# Patient Record
Sex: FEMALE | Race: WHITE | Employment: FULL TIME | URBAN - METROPOLITAN AREA
[De-identification: names, ages, dates, MRNs, and addresses within clinical notes are randomized per-mention and may not be internally consistent; named-entity substitution may affect disease eponyms.]

---

## 2017-12-14 PROBLEM — E66.01 OBESITY, MORBID (HCC): Status: ACTIVE | Noted: 2017-12-14

## 2019-09-25 ENCOUNTER — HOSPITAL ENCOUNTER (OUTPATIENT)
Dept: MAMMOGRAPHY | Age: 44
Discharge: HOME | End: 2019-09-25
Attending: OBSTETRICS & GYNECOLOGY

## 2019-09-25 DIAGNOSIS — Z12.39 ENCOUNTER FOR SCREENING FOR MALIGNANT NEOPLASM OF BREAST: ICD-10-CM

## 2021-04-26 ENCOUNTER — HOSPITAL ENCOUNTER (OUTPATIENT)
Dept: SLEEP MEDICINE | Age: 46
Discharge: HOME | End: 2021-04-26
Payer: OTHER GOVERNMENT

## 2021-04-26 PROCEDURE — 95810 POLYSOM 6/> YRS 4/> PARAM: CPT

## 2021-05-05 PROBLEM — G47.33 OSA (OBSTRUCTIVE SLEEP APNEA): Status: ACTIVE | Noted: 2021-05-05

## 2021-05-05 PROBLEM — R40.0 DAYTIME SLEEPINESS: Status: ACTIVE | Noted: 2021-05-05

## 2021-09-16 ENCOUNTER — APPOINTMENT (RX ONLY)
Dept: URBAN - METROPOLITAN AREA CLINIC 349 | Facility: CLINIC | Age: 46
Setting detail: DERMATOLOGY
End: 2021-09-16

## 2021-09-16 DIAGNOSIS — Z71.89 OTHER SPECIFIED COUNSELING: ICD-10-CM

## 2021-09-16 DIAGNOSIS — D22 MELANOCYTIC NEVI: ICD-10-CM

## 2021-09-16 DIAGNOSIS — L57.8 OTHER SKIN CHANGES DUE TO CHRONIC EXPOSURE TO NONIONIZING RADIATION: ICD-10-CM

## 2021-09-16 DIAGNOSIS — D18.0 HEMANGIOMA: ICD-10-CM

## 2021-09-16 PROBLEM — D48.5 NEOPLASM OF UNCERTAIN BEHAVIOR OF SKIN: Status: ACTIVE | Noted: 2021-09-16

## 2021-09-16 PROBLEM — D18.01 HEMANGIOMA OF SKIN AND SUBCUTANEOUS TISSUE: Status: ACTIVE | Noted: 2021-09-16

## 2021-09-16 PROBLEM — D22.5 MELANOCYTIC NEVI OF TRUNK: Status: ACTIVE | Noted: 2021-09-16

## 2021-09-16 PROCEDURE — ? EDUCATIONAL RESOURCES PROVIDED

## 2021-09-16 PROCEDURE — ? SUNSCREEN RECOMMENDATIONS

## 2021-09-16 PROCEDURE — ? COUNSELING

## 2021-09-16 PROCEDURE — ? OBSERVATION

## 2021-09-16 PROCEDURE — ? OTHER

## 2021-09-16 PROCEDURE — 88305 TISSUE EXAM BY PATHOLOGIST: CPT

## 2021-09-16 PROCEDURE — 99204 OFFICE O/P NEW MOD 45 MIN: CPT | Mod: 25

## 2021-09-16 PROCEDURE — ? BODY PHOTOGRAPHY

## 2021-09-16 PROCEDURE — ? REFERRAL CORRESPONDENCE

## 2021-09-16 PROCEDURE — ? SHAVE REMOVAL

## 2021-09-16 PROCEDURE — ? PATHOLOGY BILLING

## 2021-09-16 PROCEDURE — 11311 SHAVE SKIN LESION 0.6-1.0 CM: CPT

## 2021-09-16 ASSESSMENT — LOCATION ZONE DERM
LOCATION ZONE: TRUNK
LOCATION ZONE: FACE
LOCATION ZONE: FACE
LOCATION ZONE: ARM

## 2021-09-16 ASSESSMENT — LOCATION SIMPLE DESCRIPTION DERM
LOCATION SIMPLE: UPPER BACK
LOCATION SIMPLE: LEFT FOREHEAD
LOCATION SIMPLE: LEFT SHOULDER
LOCATION SIMPLE: LEFT UPPER BACK
LOCATION SIMPLE: LEFT FOREARM
LOCATION SIMPLE: LEFT FOREHEAD

## 2021-09-16 ASSESSMENT — LOCATION DETAILED DESCRIPTION DERM
LOCATION DETAILED: LEFT LATERAL FOREHEAD
LOCATION DETAILED: LEFT MEDIAL UPPER BACK
LOCATION DETAILED: LEFT DISTAL DORSAL FOREARM
LOCATION DETAILED: LEFT LATERAL FOREHEAD
LOCATION DETAILED: LEFT POSTERIOR SHOULDER
LOCATION DETAILED: INFERIOR THORACIC SPINE
LOCATION DETAILED: LEFT LATERAL SHOULDER
LOCATION DETAILED: LEFT INFERIOR MEDIAL UPPER BACK

## 2021-09-16 NOTE — PROCEDURE: BODY PHOTOGRAPHY
Whole Body Statement: The whole body was photographed today.
Was The Entire Body Photographed (Cannot Bill Unless Entire Body Photographed)?: No
Detail Level: Generalized
Reason For Photography: The patient is obtaining body photography to observe existing suspicious moles and or monitor for the appearance of any new lesions.
Consent: Written consent obtained, risks reviewed for whole body photography. Patient understands that photograph costs may not be covered by insurance, and patient is ultimately responsible for payment.
Number Of Photographs (Optional- Will Not Render If 0): 13

## 2021-09-16 NOTE — PROCEDURE: OTHER
Render Risk Assessment In Note?: yes
Other (Free Text): Discussed risk of bleeding, scarring, and infection.
Patient Management Risk Assessment: Moderate
Note Text (......Xxx Chief Complaint.): This diagnosis correlates with the
Detail Level: Detailed

## 2022-02-06 ENCOUNTER — NURSE TRIAGE (OUTPATIENT)
Dept: OTHER | Facility: CLINIC | Age: 47
End: 2022-02-06

## 2022-02-07 NOTE — TELEPHONE ENCOUNTER
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 \"     Current Symptoms: none at this time    Pain Severity: n/a; n/a; n/a    Temperature: patient denies n/a    What has been tried: nothing    LMP: 1/30/2022 Pregnant: No    Recommended disposition: See PCP within 3 days. Care advice provided, patient verbalizes understanding; denies any other questions or concerns; instructed to call back for any new or worsening symptoms. Will follow up with [facility] onsite and PCP        Reason for Disposition  Renata Kennedy provider with EXPOSURE to Tuberculosis    Answer Assessment - Initial Assessment Questions  1. PLACE of EXPOSURE: \"Where were you when you were exposed to Tuberculosis? 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 2. TYPE of EXPOSURE: \"How were you exposed? \" (e.g., close contact)      12 hour shift with patient    3. DATE of EXPOSURE: \"When did the exposure occur? \" (e.g., days)      2/1/2022    4. PREGNANCY: \"Is there any chance you are pregnant? \" \"When was your last menstrual period? \"      No    5. HIGH RISK for COMPLICATIONS: \"Do you have a weakened immune system? \" (e.g., HIV positive, cancer chemotherapy)      Just got over COVID    6. TB SKIN TEST: \"When was your last TB Skin Test?\" (e.g., date, never had one, uncertain)  \"What was the result? \" (e.g., positive, negative;  width in millimeters; caller unsure)      A year and 5 months ago.  Negative    Protocols used: TUBERCULOSIS EXPOSURE-ADULT-

## 2022-03-19 PROBLEM — E66.01 MORBID OBESITY WITH BMI OF 40.0-44.9, ADULT (HCC): Status: ACTIVE | Noted: 2017-12-14

## 2022-03-19 PROBLEM — G47.33 OSA (OBSTRUCTIVE SLEEP APNEA): Status: ACTIVE | Noted: 2021-05-05

## 2022-03-19 PROBLEM — R40.0 DAYTIME SLEEPINESS: Status: ACTIVE | Noted: 2021-05-05

## 2022-05-27 ENCOUNTER — OFFICE VISIT (OUTPATIENT)
Dept: FAMILY MEDICINE CLINIC | Facility: CLINIC | Age: 47
End: 2022-05-27
Payer: OTHER GOVERNMENT

## 2022-05-27 VITALS
BODY MASS INDEX: 39.51 KG/M2 | HEIGHT: 63 IN | SYSTOLIC BLOOD PRESSURE: 120 MMHG | WEIGHT: 223 LBS | DIASTOLIC BLOOD PRESSURE: 80 MMHG

## 2022-05-27 DIAGNOSIS — E66.01 MORBID OBESITY WITH BMI OF 40.0-44.9, ADULT (HCC): ICD-10-CM

## 2022-05-27 DIAGNOSIS — Z12.31 SCREENING MAMMOGRAM FOR HIGH-RISK PATIENT: ICD-10-CM

## 2022-05-27 DIAGNOSIS — I10 ESSENTIAL HYPERTENSION: ICD-10-CM

## 2022-05-27 DIAGNOSIS — E03.9 ACQUIRED HYPOTHYROIDISM: Primary | ICD-10-CM

## 2022-05-27 DIAGNOSIS — F41.9 ANXIETY: ICD-10-CM

## 2022-05-27 DIAGNOSIS — Z00.00 ROUTINE GENERAL MEDICAL EXAMINATION AT A HEALTH CARE FACILITY: ICD-10-CM

## 2022-05-27 DIAGNOSIS — Z12.11 SPECIAL SCREENING FOR MALIGNANT NEOPLASMS, COLON: ICD-10-CM

## 2022-05-27 PROCEDURE — 99396 PREV VISIT EST AGE 40-64: CPT | Performed by: FAMILY MEDICINE

## 2022-05-27 RX ORDER — ESCITALOPRAM OXALATE 20 MG/1
20 TABLET ORAL DAILY
Qty: 90 TABLET | Refills: 3 | Status: SHIPPED | OUTPATIENT
Start: 2022-05-27 | End: 2022-08-22

## 2022-05-27 RX ORDER — TOPIRAMATE 25 MG/1
25 TABLET ORAL 2 TIMES DAILY
Qty: 180 TABLET | Refills: 3 | Status: SHIPPED | OUTPATIENT
Start: 2022-05-27 | End: 2022-08-17

## 2022-05-27 RX ORDER — LEVOTHYROXINE AND LIOTHYRONINE 76; 18 UG/1; UG/1
120 TABLET ORAL DAILY
Qty: 90 TABLET | Refills: 3 | Status: SHIPPED | OUTPATIENT
Start: 2022-05-27 | End: 2022-10-30

## 2022-05-27 RX ORDER — PHENTERMINE HYDROCHLORIDE 37.5 MG/1
37.5 TABLET ORAL DAILY
Qty: 30 TABLET | Refills: 0 | Status: SHIPPED | OUTPATIENT
Start: 2022-05-27 | End: 2022-06-26

## 2022-05-27 RX ORDER — LEVOTHYROXINE AND LIOTHYRONINE 9.5; 2.25 UG/1; UG/1
15 TABLET ORAL DAILY
Qty: 90 TABLET | Refills: 3 | Status: CANCELLED | OUTPATIENT
Start: 2022-05-27

## 2022-05-27 RX ORDER — TOPIRAMATE 25 MG/1
TABLET ORAL
COMMUNITY
Start: 2022-04-21 | End: 2022-05-27 | Stop reason: SDUPTHER

## 2022-05-27 RX ORDER — LISINOPRIL AND HYDROCHLOROTHIAZIDE 25; 20 MG/1; MG/1
1 TABLET ORAL DAILY
Qty: 90 TABLET | Refills: 3 | Status: SHIPPED | OUTPATIENT
Start: 2022-05-27 | End: 2022-08-09 | Stop reason: SDUPTHER

## 2022-05-27 RX ORDER — LORAZEPAM 1 MG/1
1 TABLET ORAL DAILY
Qty: 30 TABLET | Refills: 5 | Status: SHIPPED | OUTPATIENT
Start: 2022-05-27 | End: 2022-06-26

## 2022-05-27 ASSESSMENT — ENCOUNTER SYMPTOMS
SHORTNESS OF BREATH: 0
COUGH: 0
ABDOMINAL PAIN: 0

## 2022-05-27 NOTE — PROGRESS NOTES
PROGRESS NOTE    SUBJECTIVE:   Allegra Wilde is a 50 y.o. female seen for a follow up visit regarding the following chief complaint:     Chief Complaint   Patient presents with    Annual Exam    Discuss Labs    Medication Refill           HPI  Patient presents to the office today for complete physical without complaints states that she is going to a OB/GYN to get her Pap smears done    Past Medical History, Past Surgical History, Family history, Social History, and Medications were all reviewed with the patient today and updated as necessary. Current Outpatient Medications   Medication Sig Dispense Refill    escitalopram (LEXAPRO) 20 MG tablet Take 1 tablet by mouth daily 90 tablet 3    lisinopril-hydroCHLOROthiazide (PRINZIDE;ZESTORETIC) 20-25 MG per tablet Take 1 tablet by mouth daily TAKE ONE TABLET BY MOUTH ONE TIME DAILY 90 tablet 3    LORazepam (ATIVAN) 1 MG tablet Take 1 tablet by mouth daily for 30 days. 30 tablet 5    phentermine (ADIPEX-P) 37.5 MG tablet Take 1 tablet by mouth daily for 30 days. 30 tablet 0    thyroid (NP THYROID) 120 MG tablet Take 1 tablet by mouth daily 90 tablet 3    topiramate (TOPAMAX) 25 MG tablet Take 1 tablet by mouth 2 times daily 180 tablet 3    fluticasone (FLONASE) 50 MCG/ACT nasal spray 2 sprays by Nasal route daily      thyroid (NP THYROID) 15 MG tablet TAKE ONE TABLET BY MOUTH ONE TIME DAILY       No current facility-administered medications for this visit.      Allergies   Allergen Reactions    Aspirin Other (See Comments)     Pt reports higher dose ASA when she was younger     Ondansetron Itching    Codeine Nausea And Vomiting     Patient Active Problem List   Diagnosis    Essential hypertension    Acquired hypothyroidism    AR (allergic rhinitis)    Morbid obesity with BMI of 40.0-44.9, adult (HCC)    Daytime sleepiness    KISHA (obstructive sleep apnea)     Past Medical History:   Diagnosis Date    Acute sinusitis     Anxiety state, unspecified     AR (allergic rhinitis)     Dermatitis     Eczema     Hypertension     Hypothyroid     IBS (irritable bowel syndrome)     Unspecified hypothyroidism     Urticaria      Past Surgical History:   Procedure Laterality Date    ABDOMINOPLASTY  2005    BREAST SURGERY  2006    IMPLANT BREAST SILICONE/EQ       Family History   Problem Relation Age of Onset    No Known Problems Father     Breast Cancer Mother      Social History     Tobacco Use    Smoking status: Former Smoker     Packs/day: 0.50     Quit date: 2007     Years since quittin.5    Smokeless tobacco: Never Used   Substance Use Topics    Alcohol use: No         Review of Systems   Constitutional: Negative for chills and fever. Respiratory: Negative for cough and shortness of breath. Cardiovascular: Negative for chest pain. Gastrointestinal: Negative for abdominal pain. Endocrine: Negative for cold intolerance and heat intolerance. Genitourinary: Negative for difficulty urinating, frequency, hematuria, pelvic pain, vaginal bleeding, vaginal discharge and vaginal pain. Neurological: Negative for headaches. Psychiatric/Behavioral: Negative. OBJECTIVE:  /80 (Site: Left Upper Arm, Position: Sitting, Cuff Size: Large Adult)   Ht 5' 3\" (1.6 m)   Wt 223 lb (101.2 kg)   BMI 39.50 kg/m²      Physical Exam  Vitals and nursing note reviewed. Constitutional:       General: She is not in acute distress. Appearance: Normal appearance. She is obese. HENT:      Head: Normocephalic and atraumatic. Nose: Nose normal.      Mouth/Throat:      Mouth: Mucous membranes are moist.      Pharynx: No oropharyngeal exudate or posterior oropharyngeal erythema. Eyes:      Extraocular Movements: Extraocular movements intact. Conjunctiva/sclera: Conjunctivae normal.      Pupils: Pupils are equal, round, and reactive to light. Cardiovascular:      Rate and Rhythm: Normal rate and regular rhythm. Pulses: Normal pulses. Heart sounds: Normal heart sounds. Pulmonary:      Effort: Pulmonary effort is normal. No respiratory distress. Breath sounds: Normal breath sounds. No wheezing. Abdominal:      General: Abdomen is flat. Bowel sounds are normal.      Palpations: Abdomen is soft. There is no mass. Hernia: No hernia is present. Genitourinary:     Comments: Deferred done by OB/GYN  Musculoskeletal:         General: Normal range of motion. Cervical back: Normal range of motion and neck supple. Skin:     General: Skin is warm and dry. Capillary Refill: Capillary refill takes less than 2 seconds. Neurological:      General: No focal deficit present. Mental Status: She is alert and oriented to person, place, and time. Psychiatric:         Mood and Affect: Mood normal.         Behavior: Behavior normal.         Thought Content: Thought content normal.         Judgment: Judgment normal.          Medical problems and test results were reviewed with the patient today.      Recent Results (from the past 672 hour(s))   TSH 3RD GENERATION    Collection Time: 05/17/22  1:53 AM   Result Value Ref Range    TSH <0.005 (L) 0.450 - 4.500 uIU/mL   Lipid Panel    Collection Time: 05/17/22  1:53 AM   Result Value Ref Range    Cholesterol, Total 150 100 - 199 mg/dL    Triglycerides 91 0 - 149 mg/dL    HDL 55 >39 mg/dL    VLDL 17 5 - 40 mg/dL    LDL Calculated 78 0 - 99 mg/dL   Vitamin D 25 Hydroxy    Collection Time: 05/17/22  1:53 AM   Result Value Ref Range    Vit D, 25-Hydroxy 33.3 30.0 - 100.0 ng/mL   Comprehensive Metabolic Panel    Collection Time: 05/17/22  1:53 AM   Result Value Ref Range    Glucose 93 65 - 99 mg/dL    BUN 15 6 - 24 mg/dL    CREATININE 0.61 0.57 - 1.00 mg/dL    EGFR 110 >59 mL/min/1.73    Bun/Cre Ratio 25 (H) 9 - 23 NA    Sodium 140 134 - 144 mmol/L    Potassium 3.8 3.5 - 5.2 mmol/L    Chloride 103 96 - 106 mmol/L    CO2 23 20 - 29 mmol/L    Calcium 9.3 8.7 - 10.2 mg/dL    Total Protein 6.9 6.0 - 8.5 g/dL    Albumin 3.9 3.8 - 4.8 g/dL    Globulin, Total 3.0 1.5 - 4.5 g/dL    Albumin/Globulin Ratio 1.3 1.2 - 2.2 NA    Total Bilirubin 0.2 0.0 - 1.2 mg/dL    Alkaline Phosphatase 229 (H) 44 - 121 IU/L    AST 22 0 - 40 IU/L    ALT 45 (H) 0 - 32 IU/L   AMB POC COMPLETE CBC,AUTOMATED ENTER    Collection Time: 05/17/22  9:30 AM   Result Value Ref Range    WBC, POC 6.2 4.1 - 10.9 10^3/ul    Lymphs Abs 2.6 0.6 - 4.1 10^3/ul    Mid Cells [place] POC 0.5 0.0 - 1.8 10^3/ul    Granulocytes Abs 3.2 2.0 - 7.8 10^3/ul    Lymphocyte % 41.5 10.0 - 58.5 %    Mid Cells %, POC 7.4 0.1 - 24.0 %    Granulocytes %, POC 51.1 37.0 - 92.0 %    RBC, POC 4.68 4.20 - 6.30 10^6/ul    Hemoglobin, POC 14.0 12.0 - 18.0 g/dL    Hematocrit, POC 41.9 37.0 - 51.0 %    MCV 89.6 80.0 - 97.0 fL    MCH 29.9 26.0 - 32.0 pg    MCHC 33.4 31.0 - 36.0 g/dL    RDW, POC 13.0 11.5 - 14.5 %    Platelet Count,  140 - 440 10^3/ul    MPV POC 8.7 0.0 - 49.9 fL   AMB POC URINALYSIS DIP STICK AUTO W/ MICRO    Collection Time: 05/17/22  9:30 AM   Result Value Ref Range    Color (UA POC) Yellow     Clarity (UA POC) Clear     Glucose, Urine, POC Negative Negative    Bilirubin, Urine, POC Negative Negative    KETONES, Urine, POC Negative Negative    Specific Gravity, Urine, POC 1.025 1.001 - 1.035 NA    Blood (UA POC) Negative Negative    pH, Urine, POC 6.5 4.6 - 8.0 NA    Protein, Urine, POC Negative Negative    Urobilinogen, POC normal 0.2 - 1    Nitrite, Urine, POC Negative Negative    Leukocyte Esterase, Urine, POC Negative Negative       ASSESSMENT and PLAN    Visit Diagnoses and Associated Orders     Acquired hypothyroidism    -  Primary    thyroid (NP THYROID) 120 MG tablet [7938]      TSH [[id number] Custom]   - Future Order    T3 [[id number] Custom]   - Future Order    T4 Nixon.Drought Custom]   - Future Order         Morbid obesity with BMI of 40.0-44.9, adult (HCC)        phentermine (ADIPEX-P) 37.5 MG tablet [6234]      topiramate (TOPAMAX) 25 MG tablet [51170]           Anxiety        escitalopram (LEXAPRO) 20 MG tablet [42002]      LORazepam (ATIVAN) 1 MG tablet [4573]           Essential hypertension        lisinopril-hydroCHLOROthiazide (PRINZIDE;ZESTORETIC) 20-25 MG per tablet [86164]           Special screening for malignant neoplasms, [facility] Surgical - Colonoscopy [[id number] Custom]           Routine general medical examination at a health care facility        CBC with Auto Differential [[id number] Custom]   - Future Order    Comprehensive Metabolic Panel [32233 Custom]   - Future Order    Lipid Panel [[id number] Custom]   - Future Order    Vitamin D 25 Hydroxy [[id number] Custom]   - Future Order    Hepatitis C Antibody [28273 Custom]   - Future Order    Urinalysis, Chem only [[id number] Custom]   - Future Order    TSH [[id number] Custom]   - Future Order         Screening mammogram for high-risk patient        [facility] DIGITAL DIAGNOSTIC W OR WO CAD BILATERAL [[id number] Custom]   - Future Order           Patient had a normal physical exam reviewed her labs answered all her questions counseling and supportive care gone over continue on her present chronic medications went over risk benefits and options of her controlled medications recommended rechecking her TSH after 6 weeks and decreasing down to her NP thyroid 120 mg a day and recheck a TSH 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 Diagnoses and all orders for this visit:    Acquired hypothyroidism  -     thyroid (NP THYROID) 120 MG tablet; Take 1 tablet by mouth daily  -     TSH; Future  -     T3; Future  -     T4; Future    Morbid obesity with BMI of 40.0-44.9, adult (HCC)  -     phentermine (ADIPEX-P) 37.5 MG tablet; Take 1 tablet by mouth daily for 30 days. -     topiramate (TOPAMAX) 25 MG tablet;  Take 1 tablet by mouth 2 times daily    Anxiety  -     escitalopram (LEXAPRO) 20 MG tablet; Take 1 tablet by mouth daily  -     LORazepam (ATIVAN) 1 MG tablet; Take 1 tablet by mouth daily for 30 days. Essential hypertension  -     lisinopril-hydroCHLOROthiazide (PRINZIDE;ZESTORETIC) 20-25 MG per tablet; Take 1 tablet by mouth daily TAKE ONE TABLET BY MOUTH ONE TIME DAILY    Special screening for malignant neoplasms, colon  -     [facility] Surgical - Colonoscopy    Routine general medical examination at a health care facility  -     CBC with Auto Differential; Future  -     Comprehensive Metabolic Panel; Future  -     Lipid Panel; Future  -     Vitamin D 25 Hydroxy; Future  -     Hepatitis C Antibody; Future  -     Urinalysis, Chem only; Future  -     TSH; Future    Screening mammogram for high-risk patient  -     [facility] DIGITAL DIAGNOSTIC W OR WO CAD BILATERAL;  Future

## 2022-05-31 DIAGNOSIS — I10 ESSENTIAL HYPERTENSION: ICD-10-CM

## 2022-05-31 DIAGNOSIS — E03.9 ACQUIRED HYPOTHYROIDISM: ICD-10-CM

## 2022-05-31 DIAGNOSIS — E66.01 MORBID OBESITY WITH BMI OF 40.0-44.9, ADULT (HCC): ICD-10-CM

## 2022-05-31 DIAGNOSIS — F41.9 ANXIETY: ICD-10-CM

## 2022-06-01 RX ORDER — PHENTERMINE HYDROCHLORIDE 37.5 MG/1
37.5 TABLET ORAL DAILY
Qty: 30 TABLET | Refills: 0 | OUTPATIENT
Start: 2022-06-01 | End: 2022-07-01

## 2022-06-01 RX ORDER — LEVOTHYROXINE AND LIOTHYRONINE 76; 18 UG/1; UG/1
120 TABLET ORAL DAILY
Qty: 90 TABLET | Refills: 3 | OUTPATIENT
Start: 2022-06-01

## 2022-06-01 RX ORDER — ESCITALOPRAM OXALATE 20 MG/1
20 TABLET ORAL DAILY
Qty: 90 TABLET | Refills: 3 | OUTPATIENT
Start: 2022-06-01

## 2022-06-01 RX ORDER — LISINOPRIL AND HYDROCHLOROTHIAZIDE 25; 20 MG/1; MG/1
1 TABLET ORAL DAILY
Qty: 90 TABLET | Refills: 3 | OUTPATIENT
Start: 2022-06-01

## 2022-06-07 ENCOUNTER — TELEPHONE (OUTPATIENT)
Dept: FAMILY MEDICINE CLINIC | Facility: CLINIC | Age: 47
End: 2022-06-07

## 2022-06-07 DIAGNOSIS — E05.81 OTHER THYROTOXICOSIS WITH THYROTOXIC CRISIS OR STORM: Primary | ICD-10-CM

## 2022-07-07 ENCOUNTER — OFFICE VISIT (OUTPATIENT)
Dept: FAMILY MEDICINE CLINIC | Facility: CLINIC | Age: 47
End: 2022-07-07
Payer: OTHER GOVERNMENT

## 2022-07-07 VITALS
HEIGHT: 63 IN | SYSTOLIC BLOOD PRESSURE: 120 MMHG | DIASTOLIC BLOOD PRESSURE: 78 MMHG | BODY MASS INDEX: 40.57 KG/M2 | WEIGHT: 229 LBS

## 2022-07-07 DIAGNOSIS — Z77.21 EXPOSURE TO BLOOD-BORNE PATHOGEN: ICD-10-CM

## 2022-07-07 DIAGNOSIS — E66.01 MORBID OBESITY WITH BMI OF 40.0-44.9, ADULT (HCC): ICD-10-CM

## 2022-07-07 DIAGNOSIS — R53.83 FATIGUE, UNSPECIFIED TYPE: Primary | ICD-10-CM

## 2022-07-07 DIAGNOSIS — R53.83 FATIGUE, UNSPECIFIED TYPE: ICD-10-CM

## 2022-07-07 DIAGNOSIS — Z00.00 ROUTINE GENERAL MEDICAL EXAMINATION AT A HEALTH CARE FACILITY: ICD-10-CM

## 2022-07-07 DIAGNOSIS — E03.9 ACQUIRED HYPOTHYROIDISM: ICD-10-CM

## 2022-07-07 LAB
BASOPHILS # BLD: 0.1 K/UL (ref 0–0.2)
BASOPHILS NFR BLD: 1 % (ref 0–2)
DIFFERENTIAL METHOD BLD: NORMAL
EOSINOPHIL # BLD: 0.1 K/UL (ref 0–0.8)
EOSINOPHIL NFR BLD: 2 % (ref 0.5–7.8)
ERYTHROCYTE [DISTWIDTH] IN BLOOD BY AUTOMATED COUNT: 13.1 % (ref 11.9–14.6)
HCT VFR BLD AUTO: 43.6 % (ref 35.8–46.3)
HCV AB SER QL: NONREACTIVE
HGB BLD-MCNC: 13.9 G/DL (ref 11.7–15.4)
HIV 1+2 AB+HIV1 P24 AG SERPL QL IA: NONREACTIVE
HIV 1/2 RESULT COMMENT: NORMAL
IMM GRANULOCYTES # BLD AUTO: 0 K/UL (ref 0–0.5)
IMM GRANULOCYTES NFR BLD AUTO: 0 % (ref 0–5)
LYMPHOCYTES # BLD: 2.2 K/UL (ref 0.5–4.6)
LYMPHOCYTES NFR BLD: 30 % (ref 13–44)
MCH RBC QN AUTO: 28.7 PG (ref 26.1–32.9)
MCHC RBC AUTO-ENTMCNC: 31.9 G/DL (ref 31.4–35)
MCV RBC AUTO: 90.1 FL (ref 79.6–97.8)
MONOCYTES # BLD: 0.6 K/UL (ref 0.1–1.3)
MONOCYTES NFR BLD: 8 % (ref 4–12)
NEUTS SEG # BLD: 4.5 K/UL (ref 1.7–8.2)
NEUTS SEG NFR BLD: 59 % (ref 43–78)
NRBC # BLD: 0 K/UL (ref 0–0.2)
PLATELET # BLD AUTO: 324 K/UL (ref 150–450)
PMV BLD AUTO: 10.7 FL (ref 9.4–12.3)
RBC # BLD AUTO: 4.84 M/UL (ref 4.05–5.2)
TSH, 3RD GENERATION: 0.01 UIU/ML (ref 0.36–3.74)
WBC # BLD AUTO: 7.5 K/UL (ref 4.3–11.1)

## 2022-07-07 PROCEDURE — 99214 OFFICE O/P EST MOD 30 MIN: CPT | Performed by: FAMILY MEDICINE

## 2022-07-07 RX ORDER — PHENTERMINE HYDROCHLORIDE 37.5 MG/1
37.5 TABLET ORAL
COMMUNITY
End: 2022-07-07 | Stop reason: SDUPTHER

## 2022-07-07 RX ORDER — PHENTERMINE HYDROCHLORIDE 37.5 MG/1
37.5 TABLET ORAL
Qty: 30 TABLET | Refills: 0 | Status: SHIPPED | OUTPATIENT
Start: 2022-07-07 | End: 2022-08-06

## 2022-07-07 ASSESSMENT — ENCOUNTER SYMPTOMS
ABDOMINAL PAIN: 0
DIARRHEA: 0
COUGH: 0
SHORTNESS OF BREATH: 0
RHINORRHEA: 0
SINUS PAIN: 0

## 2022-07-07 NOTE — PROGRESS NOTES
List   Diagnosis    Essential hypertension    Acquired hypothyroidism    AR (allergic rhinitis)    Morbid obesity with BMI of 40.0-44.9, adult ([facility])    Daytime sleepiness    KISHA (obstructive sleep apnea)     Past Medical History:   Diagnosis Date    Acute sinusitis     Anxiety state, unspecified     AR (allergic rhinitis)     Dermatitis     Eczema     Hypertension     Hypothyroid     IBS (irritable bowel syndrome)     Unspecified hypothyroidism     Urticaria      Past Surgical History:   Procedure Laterality Date    ABDOMINOPLASTY  2005    BREAST SURGERY  2006    IMPLANT BREAST SILICONE/EQ       Family History   Problem Relation Age of Onset    No Known Problems Father     Breast Cancer Mother      Social History     Tobacco Use    Smoking status: Former Smoker     Packs/day: 0.50     Quit date: 2007     Years since quittin.6    Smokeless tobacco: Never Used   Substance Use Topics    Alcohol use: No         Review of Systems   Constitutional: Positive for fatigue. Negative for chills and fever. HENT: Negative for congestion, rhinorrhea and sinus pain. Eyes: Negative for visual disturbance. Respiratory: Negative for cough and shortness of breath. Cardiovascular: Negative for chest pain. Gastrointestinal: Negative for abdominal pain and diarrhea. Genitourinary: Negative for dysuria. Musculoskeletal: Negative for arthralgias and myalgias. Neurological: Negative for dizziness, weakness and headaches. Psychiatric/Behavioral: The patient is nervous/anxious. OBJECTIVE:  /78 (Site: Left Upper Arm, Position: Sitting, Cuff Size: Large Adult)   Ht 5' 3\" (1.6 m)   Wt 229 lb (103.9 kg)   BMI 40.57 kg/m²      Physical Exam  Vitals and nursing note reviewed. Constitutional:       General: She is not in acute distress. Appearance: Normal appearance. She is obese. HENT:      Head: Normocephalic and atraumatic.    Cardiovascular:      Rate and Rhythm: Normal rate and regular rhythm. Pulses: Normal pulses. Heart sounds: Normal heart sounds. Pulmonary:      Effort: No respiratory distress. Breath sounds: No wheezing. Abdominal:      General: Abdomen is flat. Bowel sounds are normal.      Palpations: Abdomen is soft. There is no mass. Hernia: No hernia is present. Musculoskeletal:      Cervical back: Normal range of motion. Skin:     General: Skin is warm and dry. Neurological:      General: No focal deficit present. Mental Status: She is alert and oriented to person, place, and time. Psychiatric:         Mood and Affect: Mood normal.         Behavior: Behavior normal.         Thought Content: Thought content normal.         Judgment: Judgment normal.          Medical problems and test results were reviewed with the patient today. No results found for this or any previous visit (from the past 672 hour(s)). ASSESSMENT and PLAN    Visit Diagnoses and Associated Orders     Fatigue, unspecified type    -  Primary    TSH [[id number] Custom]   - Future Order    CBC with Auto Differential [[id number] Custom]   - Future Order         Morbid obesity with BMI of 40.0-44.9, adult (HCC)        phentermine (ADIPEX-P) 37.5 MG tablet [6234]           Acquired hypothyroidism             Exposure to blood-borne pathogen        Hepatitis C Antibody [[id number] Custom]   - Future Order    HIV 1/2 Ag/Ab, 4TH Generation,W Rflx Confirm [49524 CPT(R)]   - Future Order         Routine general medical examination at a health care facility        Urinalysis [[id number] Custom]   - Future Order    CBC with Auto Differential [51295 Custom]   - Future Order    Comprehensive Metabolic Panel [[id number] Custom]   - Future Order    Lipid Panel [32952 Custom]   - Future Order    Vitamin D 25 Hydroxy [[id number] Custom]   - Future Order    TSH [[id number] Custom]   - Future Order                  Nestor Flynn was seen today for fatigue, hepatitis c and anxiety.     Diagnoses and all orders for this visit:    Fatigue, unspecified type  -     TSH; Future  -     CBC with Auto Differential; Future    Morbid obesity with BMI of 40.0-44.9, adult (HCC)  -     phentermine (ADIPEX-P) 37.5 MG tablet; Take 1 tablet by mouth every morning (before breakfast) for 30 days. Acquired hypothyroidism    Exposure to blood-borne pathogen  -     Hepatitis C Antibody; Future  -     HIV 1/2 Ag/Ab, 4TH Generation,W Rflx Confirm; Future    Routine general medical examination at a health care facility  -     Urinalysis; Future  -     CBC with Auto Differential; Future  -     Comprehensive Metabolic Panel; Future  -     Lipid Panel; Future  -     Vitamin D 25 Hydroxy; Future  -     TSH;  Future

## 2022-08-09 DIAGNOSIS — I10 ESSENTIAL HYPERTENSION: ICD-10-CM

## 2022-08-09 RX ORDER — LISINOPRIL AND HYDROCHLOROTHIAZIDE 25; 20 MG/1; MG/1
1 TABLET ORAL DAILY
Qty: 90 TABLET | Refills: 3 | Status: SHIPPED | OUTPATIENT
Start: 2022-08-09

## 2022-08-17 ENCOUNTER — OFFICE VISIT (OUTPATIENT)
Dept: ENDOCRINOLOGY | Age: 47
End: 2022-08-17
Payer: OTHER GOVERNMENT

## 2022-08-17 VITALS
BODY MASS INDEX: 41.27 KG/M2 | DIASTOLIC BLOOD PRESSURE: 82 MMHG | HEART RATE: 83 BPM | SYSTOLIC BLOOD PRESSURE: 128 MMHG | WEIGHT: 233 LBS | OXYGEN SATURATION: 95 %

## 2022-08-17 DIAGNOSIS — E66.01 MORBID OBESITY WITH BMI OF 40.0-44.9, ADULT (HCC): ICD-10-CM

## 2022-08-17 DIAGNOSIS — E06.3 HASHIMOTO'S THYROIDITIS: ICD-10-CM

## 2022-08-17 DIAGNOSIS — E03.9 PRIMARY HYPOTHYROIDISM: Primary | ICD-10-CM

## 2022-08-17 PROBLEM — Z99.89 OBSTRUCTIVE SLEEP APNEA ON CPAP: Status: ACTIVE | Noted: 2022-08-17

## 2022-08-17 PROBLEM — G47.33 OBSTRUCTIVE SLEEP APNEA ON CPAP: Status: ACTIVE | Noted: 2022-08-17

## 2022-08-17 PROCEDURE — 99244 OFF/OP CNSLTJ NEW/EST MOD 40: CPT | Performed by: INTERNAL MEDICINE

## 2022-08-17 RX ORDER — LEVOTHYROXINE SODIUM 175 UG/1
175 TABLET ORAL DAILY
Qty: 30 TABLET | Refills: 11 | Status: SHIPPED | OUTPATIENT
Start: 2022-08-17 | End: 2022-10-30 | Stop reason: DRUGHIGH

## 2022-08-17 ASSESSMENT — ENCOUNTER SYMPTOMS
DIARRHEA: 0
VOICE CHANGE: 0
SHORTNESS OF BREATH: 0
TROUBLE SWALLOWING: 0
CONSTIPATION: 0

## 2022-08-17 NOTE — PROGRESS NOTES
Mimi Dhillon MD, [address]        Reason for visit: Bushra Sanches is referred by Jenny Alvarado DO for the evaluation and management of \"other thyrotoxicosis with thyrotoxic crisis or storm\". ASSESSMENT AND PLAN:    1. Primary hypothyroidism  I do not recommend and do not prescribe desiccated porcine thyroid extract. She is currently significantly overtreated on the equivalent of levothyroxine 200 mcg daily. I will replace the NP thyroid with Levoxyl 175 mcg daily. Reassess in 2 months. - LEVOXYL 175 MCG tablet; Take 1 tablet by mouth Daily  Dispense: 30 tablet; Refill: 11  - TSH; Future  - T4, Free; Future    2. Hashimoto's thyroiditis    3. Morbid obesity with BMI of 40.0-44.9, adult [facility])  She feels that she benefited from Adipex therapy. She is probably a good candidate for GLP-1 receptor agonist therapy ([facility] or [facility]). Defer to Dr. Luda Nogueira. Follow-up and Dispositions    Return in about 2 months (around 10/17/2022). History of Present Illness:    THYROID DYSFUNCTION  Bushra Sanches is seen for new evaluation/management of hypothyroidism; this was diagnosed in ~, though she indicates that she never had an elevated TSH. Per the chart, she preferred to see Dr. Aleksander Hudson and was referred there in 2021 but was not offered an appointment. Current symptoms:  See review of systems below    Menses: irregular, oligomenorrheic    Pregnancy: , no history of infertility    Prior treatment: She was started on Synthroid at diagnosis. She has been previously treated with Levoxyl, levothyroxine, and NP Thyroid. Dr. Luda Nogueira switched her to NP Thyroid in ~.   The most recent dose adjustments have been as follows:  -135 mg daily to 120 mg daily 2022    Pertinent labs:  2016: TSH 0.401.  2016: TSH 0.749, free T4 0.98, T4 6.1, free thyroxine index 1.6.  3/7/2017: TSH 0.344.  3/28/2018: TSH disturbance. Negative for dysphoric mood. The patient is nervous/anxious. /82 (Site: Left Upper Arm, Position: Sitting)   Pulse 83   Wt 233 lb (105.7 kg)   SpO2 95%   BMI 41.27 kg/m²   Wt Readings from Last 3 Encounters:   08/17/22 233 lb (105.7 kg)   07/07/22 229 lb (103.9 kg)   05/27/22 223 lb (101.2 kg)       Physical Exam  Constitutional:       Appearance: Normal appearance. HENT:      Head: Normocephalic and atraumatic. Neck:      Thyroid: No thyroid mass or thyromegaly. Cardiovascular:      Rate and Rhythm: Normal rate and regular rhythm. Pulmonary:      Breath sounds: Normal breath sounds. Abdominal:      General: There is no distension. Palpations: Abdomen is soft. Musculoskeletal:         General: Normal range of motion. Skin:     General: Skin is warm and dry. Neurological:      General: No focal deficit present. Mental Status: She is alert. Psychiatric:         Mood and Affect: Mood and affect normal.         Speech: Speech normal.         Behavior: Behavior normal.         Thought Content: Thought content normal.       Orders Placed This Encounter   Procedures    TSH     Standing Status:   Future     Standing Expiration Date:   8/17/2023    T4, Free     Standing Status:   Future     Standing Expiration Date:   8/17/2023         Current Outpatient Medications   Medication Sig Dispense Refill    LEVOXYL 175 MCG tablet Take 1 tablet by mouth Daily 30 tablet 11    lisinopril-hydroCHLOROthiazide (PRINZIDE;ZESTORETIC) 20-25 MG per tablet Take 1 tablet by mouth in the morning. TAKE ONE TABLET BY MOUTH ONE TIME DAILY. 90 tablet 3    escitalopram (LEXAPRO) 20 MG tablet Take 1 tablet by mouth daily 90 tablet 3    thyroid (NP THYROID) 120 MG tablet Take 1 tablet by mouth daily 90 tablet 3     No current facility-administered medications for this visit.          Kalman Gosselin, MD, FACE      Portions of this note were generated with the assistance of voice recognition software. As such, some errors in transcription may be present.

## 2022-08-22 DIAGNOSIS — F41.9 ANXIETY: ICD-10-CM

## 2022-08-22 RX ORDER — ESCITALOPRAM OXALATE 20 MG/1
20 TABLET ORAL DAILY
Qty: 90 TABLET | Refills: 3 | Status: SHIPPED | OUTPATIENT
Start: 2022-08-22

## 2022-09-15 ENCOUNTER — APPOINTMENT (RX ONLY)
Dept: URBAN - METROPOLITAN AREA CLINIC 330 | Facility: CLINIC | Age: 47
Setting detail: DERMATOLOGY
End: 2022-09-15

## 2022-09-15 DIAGNOSIS — D22 MELANOCYTIC NEVI: ICD-10-CM | Status: STABLE

## 2022-09-15 DIAGNOSIS — L82.1 OTHER SEBORRHEIC KERATOSIS: ICD-10-CM

## 2022-09-15 DIAGNOSIS — Z71.89 OTHER SPECIFIED COUNSELING: ICD-10-CM

## 2022-09-15 PROBLEM — D22.5 MELANOCYTIC NEVI OF TRUNK: Status: ACTIVE | Noted: 2022-09-15

## 2022-09-15 PROBLEM — D48.5 NEOPLASM OF UNCERTAIN BEHAVIOR OF SKIN: Status: ACTIVE | Noted: 2022-09-15

## 2022-09-15 PROCEDURE — ? FULL BODY SKIN EXAM

## 2022-09-15 PROCEDURE — 99213 OFFICE O/P EST LOW 20 MIN: CPT

## 2022-09-15 PROCEDURE — ? COUNSELING

## 2022-09-15 PROCEDURE — ? MEDICAL PHOTOGRAPHY REVIEW

## 2022-09-15 PROCEDURE — ? TREATMENT REGIMEN

## 2022-09-15 PROCEDURE — ? EDUCATIONAL RESOURCES PROVIDED

## 2022-09-15 PROCEDURE — ? OTHER

## 2022-09-15 ASSESSMENT — LOCATION SIMPLE DESCRIPTION DERM
LOCATION SIMPLE: CHEST
LOCATION SIMPLE: LEFT SHOULDER
LOCATION SIMPLE: LEFT FOREARM
LOCATION SIMPLE: LEFT UPPER BACK

## 2022-09-15 ASSESSMENT — LOCATION ZONE DERM
LOCATION ZONE: TRUNK
LOCATION ZONE: ARM

## 2022-09-15 ASSESSMENT — LOCATION DETAILED DESCRIPTION DERM
LOCATION DETAILED: LEFT DISTAL DORSAL FOREARM
LOCATION DETAILED: LEFT MEDIAL SUPERIOR CHEST
LOCATION DETAILED: LEFT LATERAL SHOULDER
LOCATION DETAILED: LEFT POSTERIOR SHOULDER
LOCATION DETAILED: LEFT MEDIAL UPPER BACK

## 2022-09-15 NOTE — PROCEDURE: REASSURANCE
Hide Additional Notes?: No
Additional Notes (Optional): Compared to photo this lesion has remained stable
Detail Level: Detailed

## 2022-10-03 ENCOUNTER — APPOINTMENT (OUTPATIENT)
Dept: GENERAL RADIOLOGY | Age: 47
End: 2022-10-03
Payer: OTHER GOVERNMENT

## 2022-10-03 ENCOUNTER — HOSPITAL ENCOUNTER (EMERGENCY)
Age: 47
Discharge: HOME | End: 2022-10-03
Attending: EMERGENCY MEDICINE
Payer: OTHER GOVERNMENT

## 2022-10-03 VITALS
WEIGHT: 240 LBS | DIASTOLIC BLOOD PRESSURE: 82 MMHG | SYSTOLIC BLOOD PRESSURE: 140 MMHG | OXYGEN SATURATION: 100 % | BODY MASS INDEX: 42.52 KG/M2 | RESPIRATION RATE: 18 BRPM | HEART RATE: 85 BPM | HEIGHT: 63 IN | TEMPERATURE: 98.8 F

## 2022-10-03 DIAGNOSIS — M94.0 COSTOCHONDRITIS, ACUTE: Primary | ICD-10-CM

## 2022-10-03 PROCEDURE — 71046 X-RAY EXAM CHEST 2 VIEWS: CPT

## 2022-10-03 PROCEDURE — 99284 EMERGENCY DEPT VISIT MOD MDM: CPT

## 2022-10-03 PROCEDURE — 96372 THER/PROPH/DIAG INJ SC/IM: CPT

## 2022-10-03 PROCEDURE — 6360000002 HC RX W HCPCS

## 2022-10-03 RX ORDER — DEXAMETHASONE SODIUM PHOSPHATE 10 MG/ML
10 INJECTION, SOLUTION INTRAMUSCULAR; INTRAVENOUS ONCE
Status: COMPLETED | OUTPATIENT
Start: 2022-10-03 | End: 2022-10-03

## 2022-10-03 RX ORDER — KETOROLAC TROMETHAMINE 30 MG/ML
30 INJECTION, SOLUTION INTRAMUSCULAR; INTRAVENOUS ONCE
Status: COMPLETED | OUTPATIENT
Start: 2022-10-03 | End: 2022-10-03

## 2022-10-03 RX ORDER — LIDOCAINE 4 G/G
1 PATCH TOPICAL DAILY
Qty: 10 EACH | Refills: 0 | Status: SHIPPED | OUTPATIENT
Start: 2022-10-03 | End: 2022-10-13

## 2022-10-03 RX ADMIN — DEXAMETHASONE SODIUM PHOSPHATE 10 MG: 10 INJECTION INTRAMUSCULAR; INTRAVENOUS at 19:01

## 2022-10-03 RX ADMIN — KETOROLAC TROMETHAMINE 30 MG: 30 INJECTION, SOLUTION INTRAMUSCULAR; INTRAVENOUS at 19:00

## 2022-10-03 ASSESSMENT — PAIN SCALES - GENERAL
PAINLEVEL_OUTOF10: 0
PAINLEVEL_OUTOF10: 8

## 2022-10-03 ASSESSMENT — ENCOUNTER SYMPTOMS
DIARRHEA: 0
ABDOMINAL PAIN: 0
VOMITING: 0
COLOR CHANGE: 0
NAUSEA: 0
SHORTNESS OF BREATH: 0

## 2022-10-03 ASSESSMENT — PAIN - FUNCTIONAL ASSESSMENT: PAIN_FUNCTIONAL_ASSESSMENT: 0-10

## 2022-10-03 ASSESSMENT — PAIN DESCRIPTION - ORIENTATION: ORIENTATION: LEFT

## 2022-10-03 ASSESSMENT — PAIN DESCRIPTION - LOCATION: LOCATION: BREAST

## 2022-10-03 NOTE — ED NOTES
I have reviewed discharge instructions with the patient. The patient verbalized understanding. Patient left ED via Discharge Method: ambulatory to Home with self  Opportunity for questions and clarification provided. Patient given 1 scripts. To continue your aftercare when you leave the hospital, you may receive an automated call from our care team to check in on how you are doing. This is a free service and part of our promise to provide the best care and service to meet your aftercare needs.  If you have questions, or wish to unsubscribe from this service please call [phone]. Thank you for Choosing our [facility] Emergency Department.        Payton Price RN  10/03/22 [id number]

## 2022-10-03 NOTE — ED TRIAGE NOTES
Patient reports left breast pain, under where her breast implant is, with deep inspiration. Pain is sporadic and patient also notes she felt a hard lump on the left side of the left breast about a month ago. Patient is tearful in triage reports a great amount of stress with her daughter attempting suicide and her cat dying earlier in the week.

## 2022-10-03 NOTE — DISCHARGE INSTRUCTIONS
You were evaluated in the emergency department today for costochondritis. Chest x-ray is negative for any acute abnormality. You do have a firm spot in your left breast.  Feels like a fibroid. You will need a mammogram for better evaluation. You were given a shot of Decadron and a shot of Toradol in the emergency department for pain. I have written you prescription for lidocaine patch. Wear for 12 hours then go 12 hours without.     Contact your primary care provider tomorrow to schedule follow-up within the next week    Contact Nalini Moy to schedule a diagnostic mammogram.    Return to the emergency department if you have chest pain, shortness of breath, signs and symptoms of stroke as listed in your discharge paperwork, general worsening of your condition

## 2022-10-03 NOTE — Clinical Note
Denisha Garg was seen and treated in our emergency department on 10/3/2022. She may return to work on 10/04/2022. If you have any questions or concerns, please don't hesitate to call.       TERESA Ocasio

## 2022-10-03 NOTE — Clinical Note
Johanna Tiwari was seen and treated in our emergency department on 10/3/2022. She may return to work on 10/04/2022. If you have any questions or concerns, please don't hesitate to call.       TERESA Wood

## 2022-10-03 NOTE — ED PROVIDER NOTES
Emergency Department Provider Note                   PCP:                Whitley Colon DO               Age: 50 y.o. Sex: female       ICD-10-CM    1. Costochondritis, acute  M94.0 lidocaine 4 % external patch          DISPOSITION Decision To Discharge 10/03/2022 06:51:42 PM        MDM  Number of Diagnoses or Management Options  Costochondritis, acute  Diagnosis management comments: Vital signs reviewed, patient stable, NAD, afebrile, nontoxic in appearance     Obtain 2 view chest x-ray due to pleuritic nature of patient's pain. Chest x-ray negative for any acute cardiopulmonary abnormality. Breast exam performed in the presence of a chaperone. Patient does have a firm mobile fibroid-like feeling lump medial aspect of left breast.  No fluctuance noted, no discoloration to the breast, no nipple drainage. No axillary lymphadenopathy noted. No rash noted on chest wall. Lungs are clear to auscultation bilaterally, abdomen is soft and nontender. Rest of patient's physical exam is benign. Patient's pain likely unrelated to findings in breast.  Low clinical suspicion for an abscess in breast due to the firm nature without fluctuance. Patient states that she does go to the THE [facility] center for mammograms. I recommended that patient have a mammogram as well as follow-up with her primary care provider. Patient verbalized that she understood and she should do so. Inform patient that she needs to contact for a mammogram prior to her yearly examination as she would need a diagnostic mammogram.  Patient verbalized that she understood this. I think patient's pain is likely costochondritis. Pain is worse with a twisting motion or deep breath. Patient will be given a shot of Toradol and a shot of Decadron here in the emergency department to start treatment. Offered patient a short course of low-dose steroids, but patient declined.     Based on history, physical exam, I do not feel further work-up in the emergency department is warranted at this time. I discussed physical exam findings, laboratory and/or imaging findings, treatment and follow-up with the patient and those who were present. I answered any questions they had. They verbalized that they understood and were in agreement with treatment and disposition. I discussed signs and symptoms that would warrant a prompt return to the emergency department with the patient. I included the signs and symptoms on discharge paperwork. Patient verbalized that they understood. Patient discharged home in stable condition with a prescription for 4% lidocaine patch. She is to follow-up with her primary care provider within the next week. Return to ED precautions reiterated. Patient verbalized that she understood and she agreed.            Amount and/or Complexity of Data Reviewed  Tests in the radiology section of CPT®: ordered and reviewed  Review and summarize past medical records: yes  Independent visualization of images, tracings, or specimens: yes (Independent visualization of imaging)    Risk of Complications, Morbidity, and/or Mortality  Presenting problems: low  Diagnostic procedures: low  Management options: low    Patient Progress  Patient progress: stable             Orders Placed This Encounter   Procedures    XR CHEST (2 VW)        Medications   ketorolac (TORADOL) injection 30 mg (30 mg IntraMUSCular Given 10/3/22 1900)   dexamethasone (PF) (DECADRON) injection 10 mg (10 mg IntraMUSCular Given 10/3/22 1901)       Discharge Medication List as of 10/3/2022  6:53 PM        START taking these medications    Details   lidocaine 4 % external patch Place 1 patch onto the skin daily for 10 days, TransDERmal, DAILY Starting Mon 10/3/2022, Until Thu 10/13/2022, For 10 days, Disp-10 each, R-0, Print              Worthy Anni is a 50 y.o. female who presents to the Emergency Department with chief complaint of    Chief Complaint Patient presents with    Breast Pain      25-year-old female with history of primary hypothyroidism, KISHA, hypertension, bilateral breast Plantz, abdominoplasty presents to the emergency department today with chief complaint of left side pain under left breast with deep inspiration and some movement as well as intermittent left-sided breast pain and a \"lump\" that she feels is on her breast.  Patient describes the pain as sharp and last for only a few seconds. Patient denies shortness of breath, chest pain, nausea, vomiting, diarrhea, fevers or chills, abdominal pain, headache. Movement, touch, deep inspiration makes pain worse. Nothing makes pain better. No treatments tried. Pain is intermittent. Nothing makes patient's condition better. Deep breathing movement makes pain worse. The history is provided by the patient. No  was used. Review of Systems   Constitutional:  Negative for chills, fatigue and fever. Respiratory:  Negative for shortness of breath. Sharp pain left side with deep inspiration  Lump on left breast   Cardiovascular:  Negative for chest pain and palpitations. Gastrointestinal:  Negative for abdominal pain, diarrhea, nausea and vomiting. Skin:  Negative for color change. Neurological:  Negative for weakness and headaches. All other systems reviewed and are negative.     Past Medical History:   Diagnosis Date    Acute sinusitis     Allergic rhinitis     Dermatitis     Eczema     Generalized anxiety disorder     Hashimoto's thyroiditis     Hypertension     IBS (irritable bowel syndrome)     Obstructive sleep apnea on CPAP     Primary hypothyroidism     Urticaria         Past Surgical History:   Procedure Laterality Date    ABDOMINOPLASTY  2005    BREAST ENHANCEMENT SURGERY  2006        Family History   Problem Relation Age of Onset    Breast Cancer Mother     Hypothyroidism Mother     No Known Problems Father     Hypothyroidism Maternal Grandmother     Hypothyroidism Maternal Aunt     Thyroid Cancer Neg Hx         Social History     Socioeconomic History    Marital status:      Spouse name: None    Number of children: None    Years of education: None    Highest education level: None   Tobacco Use    Smoking status: Former     Packs/day: 0.50     Types: Cigarettes     Quit date: 2007     Years since quittin.9    Smokeless tobacco: Never   Vaping Use    Vaping Use: Never used   Substance and Sexual Activity    Alcohol use: No         Aspirin, Ondansetron, and Codeine     Discharge Medication List as of 10/3/2022  6:53 PM        CONTINUE these medications which have NOT CHANGED    Details   escitalopram (LEXAPRO) 20 MG tablet Take 1 tablet by mouth daily, Disp-90 tablet, R-3Normal      LEVOXYL 175 MCG tablet Take 1 tablet by mouth Daily, Disp-30 tablet, R-11, DAWNormal      lisinopril-hydroCHLOROthiazide (PRINZIDE;ZESTORETIC) 20-25 MG per tablet Take 1 tablet by mouth in the morning. TAKE ONE TABLET BY MOUTH ONE TIME DAILY. , Disp-90 tablet, R-3Normal      thyroid (NP THYROID) 120 MG tablet Take 1 tablet by mouth daily, Disp-90 tablet, R-3Normal              Vitals signs and nursing note reviewed. Patient Vitals for the past 4 hrs:   Temp Pulse Resp BP SpO2   10/03/22 1727 98.8 °F (37.1 °C) 85 18 (!) 140/82 100 %          Physical Exam  Vitals and nursing note reviewed. Exam conducted with a chaperone present. Constitutional:       General: She is not in acute distress. Appearance: Normal appearance. She is obese. She is not ill-appearing, toxic-appearing or diaphoretic. HENT:      Head: Normocephalic and atraumatic. Nose: Nose normal.      Mouth/Throat:      Mouth: Mucous membranes are moist.      Pharynx: Oropharynx is clear. Eyes:      General: No scleral icterus. Extraocular Movements: Extraocular movements intact.       Conjunctiva/sclera: Conjunctivae normal.   Cardiovascular:      Rate and Rhythm: Normal rate. Pulses: Normal pulses. Heart sounds: Normal heart sounds. Pulmonary:      Effort: Pulmonary effort is normal.      Breath sounds: Normal breath sounds. Abdominal:      General: Bowel sounds are normal.      Palpations: Abdomen is soft. Tenderness: There is no abdominal tenderness. There is no guarding or rebound. Musculoskeletal:         General: Normal range of motion. Cervical back: Normal range of motion. Lymphadenopathy:      Cervical: No cervical adenopathy. Upper Body:      Left upper body: No supraclavicular, axillary, pectoral or epitrochlear adenopathy. Skin:     General: Skin is warm and dry. Capillary Refill: Capillary refill takes less than 2 seconds. Coloration: Skin is not jaundiced or pale. Findings: No bruising, erythema, lesion or rash. Comments: Firm area medial aspect of left breast that feels like a fibroid on palpation. Patient states that this is a little tender. No fluctuance noted, no induration noted, no warmth to the breast  No discharge from nipple   Neurological:      General: No focal deficit present. Mental Status: She is alert and oriented to person, place, and time. Psychiatric:         Mood and Affect: Mood normal.         Behavior: Behavior normal.         Thought Content: Thought content normal.         Judgment: Judgment normal.        Procedures    Results for orders placed or performed during the hospital encounter of 10/03/22   XR CHEST (2 VW)    Narrative    PA LATERAL CHEST  10/3/2022 6:04 PM     HISTORY:  Left-sided pleuritic chest pain, \"lump in breast implant\"  ;    COMPARISON: None    FINDINGS:  The heart size is within normal limits. There is no lobar  consolidation, pleural effusions or pulmonary edema. Thoracic degenerative  spondylosis is present. Impression    No consolidation. XR CHEST (2 VW)   Final Result   No consolidation.                              Voice dictation software was used during the making of this note. This software is not perfect and grammatical and other typographical errors may be present. This note has not been completely proofread for errors.       Rocky Castillo  10/03/22 2008

## 2022-10-26 DIAGNOSIS — E03.9 PRIMARY HYPOTHYROIDISM: ICD-10-CM

## 2022-10-27 LAB
T4 FREE SERPL-MCNC: 1.2 NG/DL (ref 0.78–1.46)
TSH, 3RD GENERATION: 0.01 UIU/ML (ref 0.36–3.74)

## 2022-10-30 DIAGNOSIS — E03.9 PRIMARY HYPOTHYROIDISM: Primary | ICD-10-CM

## 2022-10-30 RX ORDER — LEVOTHYROXINE SODIUM 150 UG/1
150 TABLET ORAL DAILY
Qty: 30 TABLET | Refills: 5 | Status: SHIPPED | OUTPATIENT
Start: 2022-10-30

## 2022-10-31 NOTE — PROGRESS NOTES
Francisca message to patient:    You are overtreated with Levoxyl 175 mcg daily. Reduce dose to 150 mcg daily. I sent a new prescription in. Please return to the lab in 2 months to repeat levels. Thanks.

## 2022-11-01 ENCOUNTER — OFFICE VISIT (OUTPATIENT)
Dept: FAMILY MEDICINE CLINIC | Facility: CLINIC | Age: 47
End: 2022-11-01
Payer: OTHER GOVERNMENT

## 2022-11-01 VITALS
DIASTOLIC BLOOD PRESSURE: 80 MMHG | WEIGHT: 254 LBS | BODY MASS INDEX: 45 KG/M2 | SYSTOLIC BLOOD PRESSURE: 120 MMHG | HEIGHT: 63 IN | TEMPERATURE: 99.3 F | OXYGEN SATURATION: 96 % | HEART RATE: 91 BPM

## 2022-11-01 DIAGNOSIS — R68.89 FLU-LIKE SYMPTOMS: Primary | ICD-10-CM

## 2022-11-01 LAB
EXP DATE SOLUTION: NORMAL
EXP DATE SWAB: NORMAL
EXPIRATION DATE: NORMAL
GROUP A STREP ANTIGEN, POC: NEGATIVE
INFLUENZA A ANTIGEN, POC: NEGATIVE
INFLUENZA B ANTIGEN, POC: NEGATIVE
LOT NUMBER POC: NORMAL
LOT NUMBER SOLUTION: NORMAL
LOT NUMBER SWAB: NORMAL
SARS-COV-2 RNA, POC: NEGATIVE
VALID INTERNAL CONTROL, POC: YES
VALID INTERNAL CONTROL, POC: YES

## 2022-11-01 PROCEDURE — 99213 OFFICE O/P EST LOW 20 MIN: CPT | Performed by: FAMILY MEDICINE

## 2022-11-01 PROCEDURE — 3074F SYST BP LT 130 MM HG: CPT | Performed by: FAMILY MEDICINE

## 2022-11-01 PROCEDURE — 87804 INFLUENZA ASSAY W/OPTIC: CPT | Performed by: FAMILY MEDICINE

## 2022-11-01 PROCEDURE — 87635 SARS-COV-2 COVID-19 AMP PRB: CPT | Performed by: FAMILY MEDICINE

## 2022-11-01 PROCEDURE — 3078F DIAST BP <80 MM HG: CPT | Performed by: FAMILY MEDICINE

## 2022-11-01 PROCEDURE — 87880 STREP A ASSAY W/OPTIC: CPT | Performed by: FAMILY MEDICINE

## 2022-11-01 RX ORDER — LORAZEPAM 1 MG/1
TABLET ORAL
COMMUNITY
Start: 2022-10-19

## 2022-11-01 RX ORDER — HYDROCODONE BITARTRATE AND HOMATROPINE METHYLBROMIDE ORAL SOLUTION 5; 1.5 MG/5ML; MG/5ML
5 LIQUID ORAL EVERY 6 HOURS PRN
Qty: 140 ML | Refills: 0 | Status: SHIPPED | OUTPATIENT
Start: 2022-11-01 | End: 2022-11-08

## 2022-11-01 ASSESSMENT — ENCOUNTER SYMPTOMS
WHEEZING: 0
COUGH: 1
VOMITING: 0
NAUSEA: 0
RHINORRHEA: 1
SORE THROAT: 1
VOICE CHANGE: 0
SINUS PRESSURE: 1

## 2022-11-01 ASSESSMENT — PATIENT HEALTH QUESTIONNAIRE - PHQ9: DEPRESSION UNABLE TO ASSESS: FUNCTIONAL CAPACITY MOTIVATION LIMITS ACCURACY

## 2022-11-01 NOTE — PROGRESS NOTES
PROGRESS NOTE    SUBJECTIVE:   Dolly Betts is a 50 y.o. 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 Current Outpatient Medications   Medication Sig Dispense Refill    LEVOXYL 150 MCG tablet Take 1 tablet by mouth Daily 30 tablet 5    escitalopram (LEXAPRO) 20 MG tablet Take 1 tablet by mouth daily 90 tablet 3    lisinopril-hydroCHLOROthiazide (PRINZIDE;ZESTORETIC) 20-25 MG per tablet Take 1 tablet by mouth in the morning. TAKE ONE TABLET BY MOUTH ONE TIME DAILY. 90 tablet 3    LORazepam (ATIVAN) 1 MG tablet        No current facility-administered medications for this visit.      Allergies   Allergen Reactions    Ondansetron Itching    Codeine Nausea And Vomiting     Patient Active Problem List   Diagnosis    Essential hypertension    AR (allergic rhinitis)    Morbid obesity with BMI of 40.0-44.9, adult (HCC)    Daytime sleepiness    KISHA (obstructive sleep apnea)    Primary hypothyroidism    Obstructive sleep apnea on CPAP    Hashimoto's thyroiditis     Past Medical History:   Diagnosis Date    Acute sinusitis     Allergic rhinitis     Dermatitis     Eczema     Generalized anxiety disorder     Hashimoto's thyroiditis     Hypertension     IBS (irritable bowel syndrome)     Obstructive sleep apnea on CPAP     Primary hypothyroidism     Urticaria      Past Surgical History:   Procedure Laterality Date    ABDOMINOPLASTY  2005    BREAST ENHANCEMENT SURGERY  2006     Family History   Problem Relation Age of Onset    Breast Cancer Mother     Hypothyroidism Mother     No Known Problems Father     Hypothyroidism Maternal Grandmother     Hypothyroidism Maternal Aunt     Thyroid Cancer Neg Hx      Social History     Tobacco Use    Smoking status: Former     Packs/day: 0.50     Types: Cigarettes     Quit date: 11/1/2007     Years since quitting: 15.0    Smokeless tobacco: Never   Substance Use Topics    Alcohol use: No         Review of Systems   Constitutional:  Positive for fatigue and fever. Negative for chills. HENT:  Positive for postnasal drip, rhinorrhea, sinus pressure and sore throat. Negative for congestion, sneezing and voice change. Respiratory:  Positive for cough. Negative for wheezing. Cardiovascular:  Negative for chest pain. Gastrointestinal:  Negative for nausea and vomiting. Musculoskeletal:  Negative for arthralgias. Skin:  Negative for rash. Neurological:  Negative for headaches. Hematological:  Negative for adenopathy. OBJECTIVE:  /80 (Site: Right Upper Arm, Position: Sitting)   Pulse 91   Temp 99.3 °F (37.4 °C)   Ht 5' 3\" (1.6 m)   Wt 254 lb (115.2 kg)   SpO2 96%   BMI 44.99 kg/m²      Physical Exam  Vitals and nursing note reviewed. Constitutional:       Appearance: Normal appearance. HENT:      Head: Normocephalic and atraumatic. Right Ear: Tympanic membrane normal.      Left Ear: Tympanic membrane normal.      Nose: Nose normal.      Mouth/Throat:      Mouth: Mucous membranes are moist.   Eyes:      Conjunctiva/sclera: Conjunctivae normal.      Pupils: Pupils are equal, round, and reactive to light. Cardiovascular:      Rate and Rhythm: Normal rate and regular rhythm. Pulses: Normal pulses. Heart sounds: Normal heart sounds. Pulmonary:      Effort: Pulmonary effort is normal.      Breath sounds: Normal breath sounds. Abdominal:      General: Abdomen is flat. Bowel sounds are normal.      Palpations: Abdomen is soft. Musculoskeletal:         General: Normal range of motion.       Cervical back: Normal range of motion and neck supple. Skin:     General: Skin is warm and dry. Neurological:      General: No focal deficit present. Mental Status: She is alert and oriented to person, place, and time. Psychiatric:         Behavior: Behavior normal.        Medical problems and test results were reviewed with the patient today. Recent Results (from the past 672 hour(s))   TSH    Collection Time: 10/26/22  9:52 AM   Result Value Ref Range    TSH, 3RD GENERATION 0.009 (L) 0.358 - 3.740 uIU/mL   T4, Free    Collection Time: 10/26/22  9:52 AM   Result Value Ref Range    T4 Free 1.2 0.78 - 1.46 NG/DL   AMB POC RAPID INFLUENZA TEST    Collection Time: 11/01/22  3:37 PM   Result Value Ref Range    Valid Internal Control, POC yes     Influenza A Antigen, POC Negative Negative    Influenza B Antigen, POC Negative Negative   AMB POC COVID-19 COV    Collection Time: 11/01/22  3:38 PM   Result Value Ref Range    SARS-COV-2 RNA, POC Negative     Lot number swab [id number]     EXP date swab [id number]     Lot number solution [id number]     EXP date solution [id number]     LOT NUMBER POC [id number]     EXPIRATION DATE [id number]    AMB POC RAPID STREP A    Collection Time: 11/01/22  3:38 PM   Result Value Ref Range    Valid Internal Control, POC yes     Group A Strep Antigen, POC Negative Negative       ASSESSMENT and PLAN    Visit Diagnoses and Associated Orders       Flu-like symptoms    -  Primary    AMB POC RAPID INFLUENZA TEST [69408 CPT(R)]      AMB POC COVID-19 COV [79711 CPT(R)]      AMB POC RAPID STREP A [38818 CPT(R)]           ORDERS WITHOUT AN ASSOCIATED DIAGNOSIS    LORazepam (ATIVAN) 1 MG tablet [4573]                  Diagnosis Orders   1.  Flu-like symptoms  AMB POC RAPID INFLUENZA TEST    AMB POC COVID-19 COV    AMB POC RAPID STREP A    HYDROcodone homatropine (HYCODAN) 5-1.5 MG/5ML solution      , Diagnoses and all orders for this visit:    Flu-like symptoms  -     AMB POC RAPID INFLUENZA TEST  -     AMB POC COVID-19 COV  -     AMB POC RAPID STREP A  -     HYDROcodone homatropine (HYCODAN) 5-1.5 MG/5ML solution; Take 5 mLs by mouth every 6 hours as needed (as needed for Cough) for up to 7 days.     , Possibly RSV recommended Tylenol Advil for body aches fever chills Mucinex DM for cough during the day Hycodan at bedtime gave her prescription of Augmentin in case signs symptoms persist worsen or change with specific directions supportive care given follow-up signs symptoms persist

## 2023-01-19 ENCOUNTER — OFFICE VISIT (OUTPATIENT)
Dept: FAMILY MEDICINE CLINIC | Facility: CLINIC | Age: 48
End: 2023-01-19
Payer: OTHER GOVERNMENT

## 2023-01-19 VITALS
BODY MASS INDEX: 47.31 KG/M2 | DIASTOLIC BLOOD PRESSURE: 80 MMHG | WEIGHT: 267 LBS | SYSTOLIC BLOOD PRESSURE: 120 MMHG | HEIGHT: 63 IN

## 2023-01-19 DIAGNOSIS — E66.01 CLASS 3 SEVERE OBESITY DUE TO EXCESS CALORIES WITH SERIOUS COMORBIDITY AND BODY MASS INDEX (BMI) OF 45.0 TO 49.9 IN ADULT (HCC): ICD-10-CM

## 2023-01-19 DIAGNOSIS — Z71.3 KETOGENIC DIET MONITORING ENCOUNTER: ICD-10-CM

## 2023-01-19 DIAGNOSIS — F41.9 ANXIETY: ICD-10-CM

## 2023-01-19 DIAGNOSIS — T73.3XXD FATIGUE DUE TO EXCESSIVE EXERTION, SUBSEQUENT ENCOUNTER: Primary | ICD-10-CM

## 2023-01-19 DIAGNOSIS — T73.3XXD FATIGUE DUE TO EXCESSIVE EXERTION, SUBSEQUENT ENCOUNTER: ICD-10-CM

## 2023-01-19 LAB
BASOPHILS # BLD: 0.1 K/UL (ref 0–0.2)
BASOPHILS NFR BLD: 1 % (ref 0–2)
DIFFERENTIAL METHOD BLD: NORMAL
EOSINOPHIL # BLD: 0.2 K/UL (ref 0–0.8)
EOSINOPHIL NFR BLD: 3 % (ref 0.5–7.8)
ERYTHROCYTE [DISTWIDTH] IN BLOOD BY AUTOMATED COUNT: 13.9 % (ref 11.9–14.6)
HCT VFR BLD AUTO: 42.1 % (ref 35.8–46.3)
HGB BLD-MCNC: 13.3 G/DL (ref 11.7–15.4)
IMM GRANULOCYTES # BLD AUTO: 0 K/UL (ref 0–0.5)
IMM GRANULOCYTES NFR BLD AUTO: 0 % (ref 0–5)
LYMPHOCYTES # BLD: 1.9 K/UL (ref 0.5–4.6)
LYMPHOCYTES NFR BLD: 30 % (ref 13–44)
MCH RBC QN AUTO: 28.4 PG (ref 26.1–32.9)
MCHC RBC AUTO-ENTMCNC: 31.6 G/DL (ref 31.4–35)
MCV RBC AUTO: 90 FL (ref 82–102)
MONOCYTES # BLD: 0.5 K/UL (ref 0.1–1.3)
MONOCYTES NFR BLD: 8 % (ref 4–12)
NEUTS SEG # BLD: 3.7 K/UL (ref 1.7–8.2)
NEUTS SEG NFR BLD: 58 % (ref 43–78)
NRBC # BLD: 0 K/UL (ref 0–0.2)
PLATELET # BLD AUTO: 307 K/UL (ref 150–450)
PMV BLD AUTO: 10.8 FL (ref 9.4–12.3)
RBC # BLD AUTO: 4.68 M/UL (ref 4.05–5.2)
WBC # BLD AUTO: 6.3 K/UL (ref 4.3–11.1)

## 2023-01-19 PROCEDURE — 3074F SYST BP LT 130 MM HG: CPT | Performed by: FAMILY MEDICINE

## 2023-01-19 PROCEDURE — 3079F DIAST BP 80-89 MM HG: CPT | Performed by: FAMILY MEDICINE

## 2023-01-19 PROCEDURE — 99214 OFFICE O/P EST MOD 30 MIN: CPT | Performed by: FAMILY MEDICINE

## 2023-01-19 RX ORDER — ASCORBIC ACID 500 MG
500 TABLET ORAL DAILY
COMMUNITY

## 2023-01-19 RX ORDER — ZINC GLUCONATE 50 MG
50 TABLET ORAL DAILY
COMMUNITY

## 2023-01-19 RX ORDER — LORAZEPAM 1 MG/1
1 TABLET ORAL 2 TIMES DAILY
Qty: 60 TABLET | Refills: 0 | Status: SHIPPED | OUTPATIENT
Start: 2023-01-19 | End: 2023-02-18

## 2023-01-19 ASSESSMENT — PATIENT HEALTH QUESTIONNAIRE - PHQ9
1. LITTLE INTEREST OR PLEASURE IN DOING THINGS: 0
2. FEELING DOWN, DEPRESSED OR HOPELESS: 0
SUM OF ALL RESPONSES TO PHQ QUESTIONS 1-9: 0
SUM OF ALL RESPONSES TO PHQ9 QUESTIONS 1 & 2: 0
SUM OF ALL RESPONSES TO PHQ QUESTIONS 1-9: 0

## 2023-01-19 ASSESSMENT — ENCOUNTER SYMPTOMS
SHORTNESS OF BREATH: 0
SINUS PAIN: 0
COUGH: 0
RHINORRHEA: 0
DIARRHEA: 0
ABDOMINAL PAIN: 0

## 2023-01-19 NOTE — PROGRESS NOTES
PROGRESS NOTE    SUBJECTIVE:   Ilan Corbin is a 50 y.o. 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 Current Outpatient Medications   Medication Sig Dispense Refill    zinc gluconate 50 MG tablet Take 50 mg by mouth daily      vitamin C (ASCORBIC ACID) 500 MG tablet Take 500 mg by mouth daily      LORazepam (ATIVAN) 1 MG tablet Take 1 tablet by mouth 2 times daily for 30 days. Max Daily Amount: 2 mg 60 tablet 0    LEVOXYL 150 MCG tablet Take 1 tablet by mouth Daily 30 tablet 5    escitalopram (LEXAPRO) 20 MG tablet Take 1 tablet by mouth daily 90 tablet 3    lisinopril-hydroCHLOROthiazide (PRINZIDE;ZESTORETIC) 20-25 MG per tablet Take 1 tablet by mouth in the morning. TAKE ONE TABLET BY MOUTH ONE TIME DAILY. 90 tablet 3     No current facility-administered medications for this visit.      Allergies   Allergen Reactions    Ondansetron Itching    Codeine Nausea And Vomiting     Patient Active Problem List   Diagnosis    Essential hypertension    AR (allergic rhinitis)    Morbid obesity with BMI of 40.0-44.9, adult (HCC)    Daytime sleepiness    KISHA (obstructive sleep apnea)    Primary hypothyroidism    Obstructive sleep apnea on CPAP    Hashimoto's thyroiditis     Past Medical History:   Diagnosis Date    Acute sinusitis Allergic rhinitis     Dermatitis     Eczema     Generalized anxiety disorder     Hashimoto's thyroiditis     Hypertension     IBS (irritable bowel syndrome)     Obstructive sleep apnea on CPAP     Primary hypothyroidism     Urticaria      Past Surgical History:   Procedure Laterality Date    ABDOMINOPLASTY  2005    BREAST ENHANCEMENT SURGERY  2006     Family History   Problem Relation Age of Onset    Breast Cancer Mother     Hypothyroidism Mother     No Known Problems Father     Hypothyroidism Maternal Grandmother     Hypothyroidism Maternal Aunt     Thyroid Cancer Neg Hx      Social History     Tobacco Use    Smoking status: Former     Packs/day: 0.50     Years: 5.00     Pack years: 2.50     Types: Cigarettes     Quit date: 11/1/2007     Years since quitting: 15.2    Smokeless tobacco: Never   Substance Use Topics    Alcohol use: No         Review of Systems   Constitutional:  Negative for chills, fatigue and fever. HENT:  Negative for congestion, rhinorrhea and sinus pain. Eyes:  Negative for visual disturbance. Respiratory:  Negative for cough and shortness of breath. Cardiovascular:  Negative for chest pain. Gastrointestinal:  Negative for abdominal pain and diarrhea. Genitourinary:  Negative for dysuria. Musculoskeletal:  Negative for arthralgias and myalgias. Neurological:  Negative for dizziness, weakness and headaches. Psychiatric/Behavioral:  Negative for self-injury, sleep disturbance and suicidal ideas. The patient is not nervous/anxious. Stressed out       OBJECTIVE:  /80 (Site: Left Upper Arm, Position: Sitting, Cuff Size: Large Adult)   Ht 5' 3\" (1.6 m)   Wt 267 lb (121.1 kg)   BMI 47.30 kg/m²      Physical Exam  Vitals and nursing note reviewed. Constitutional:       Appearance: Normal appearance. HENT:      Head: Normocephalic and atraumatic.       Right Ear: Tympanic membrane normal.      Left Ear: Tympanic membrane normal.      Nose: Nose normal. Mouth/Throat:      Mouth: Mucous membranes are moist.   Eyes:      Conjunctiva/sclera: Conjunctivae normal.      Pupils: Pupils are equal, round, and reactive to light. Cardiovascular:      Rate and Rhythm: Normal rate and regular rhythm. Pulses: Normal pulses. Heart sounds: Normal heart sounds. Pulmonary:      Effort: Pulmonary effort is normal.      Breath sounds: Normal breath sounds. Abdominal:      General: Abdomen is flat. Bowel sounds are normal.      Palpations: Abdomen is soft. Musculoskeletal:         General: Normal range of motion. Cervical back: Normal range of motion and neck supple. Skin:     General: Skin is warm and dry. Neurological:      General: No focal deficit present. Mental Status: She is alert and oriented to person, place, and time. Psychiatric:         Behavior: Behavior normal.        Medical problems and test results were reviewed with the patient today. No results found for this or any previous visit (from the past 672 hour(s)).     ASSESSMENT and PLAN    Visit Diagnoses and Associated Orders       Fatigue due to excessive exertion, subsequent encounter    -  Primary    Iron [[id number] Custom]   - Future Order    Total Iron Binding Capacity [[id number] Custom]   - Future Order    Vitamin B1, Whole Blood [68961 Custom]   - Future Order    Vitamin B12 [68318 Custom]   - Future Order    Folate [36459 Custom]   - Future Order    Hemoglobin A1C [95174 Custom]   - Future Order    Insulin, Serum [40451 Custom]   - Future Order    Fatty acids, free [18984 Custom]   - Future Order         Class 3 severe obesity due to excess calories with serious comorbidity and body mass index (BMI) of 45.0 to 49.9 in [facility])        Iron [19970 Custom]   - Future Order    Total Iron Binding Capacity [[id number] Custom]   - Future Order    Vitamin B1, Whole Blood [18937 Custom]   - Future Order    Vitamin B12 [00045 Custom]   - Future Order    Folate [47767 Custom]   - Future Order Hemoglobin A1C [82921 Custom]   - Future Order    Insulin, Serum [91977 Custom]   - Future Order    Fatty acids, free [17134 Custom]   - Future Order         Anxiety        LORazepam (ATIVAN) 1 MG tablet [4573]      CBC with Auto Differential [58515 Custom]   - Future Order    Hepatic Function Panel [87292 Custom]   - Future Order         Ketogenic diet monitoring encounter        Iron [16442 Custom]   - Future Order    Total Iron Binding Capacity [90512 Custom]   - Future Order    Vitamin B1, Whole Blood [25570 Custom]   - Future Order    Vitamin B12 [21117 Custom]   - Future Order    Folate [55611 Custom]   - Future Order    Hemoglobin A1C [63791 Custom]   - Future Order    Insulin, Serum [67929 Custom]   - Future Order    Fatty acids, free [74507 Custom]   - Future Order         ORDERS WITHOUT AN ASSOCIATED DIAGNOSIS    zinc gluconate 50 MG tablet [3672]      vitamin C (ASCORBIC ACID) 500 MG tablet [9680]                  Diagnosis Orders   1. Fatigue due to excessive exertion, subsequent encounter  Iron    Total Iron Binding Capacity    Vitamin B1, Whole Blood    Vitamin B12    Folate    Hemoglobin A1C    Insulin, Serum    Fatty acids, free      2. Class 3 severe obesity due to excess calories with serious comorbidity and body mass index (BMI) of 45.0 to 49.9 in adult [facility])  Iron    Total Iron Binding Capacity    Vitamin B1, Whole Blood    Vitamin B12    Folate    Hemoglobin A1C    Insulin, Serum    Fatty acids, free      3. Anxiety  LORazepam (ATIVAN) 1 MG tablet    CBC with Auto Differential    Hepatic Function Panel      4. Ketogenic diet monitoring encounter  Iron    Total Iron Binding Capacity    Vitamin B1, Whole Blood    Vitamin B12    Folate    Hemoglobin A1C    Insulin, Serum    Fatty acids, free      , Karla Hernadez was seen today for medication refill. Diagnoses and all orders for this visit:    Fatigue due to excessive exertion, subsequent encounter  -     Iron;  Future  -     Total Iron Binding Capacity; Future  -     Vitamin B1, Whole Blood; Future  -     Vitamin B12; Future  -     Folate; Future  -     Hemoglobin A1C; Future  -     Insulin, Serum; Future  -     Fatty acids, free; Future    Class 3 severe obesity due to excess calories with serious comorbidity and body mass index (BMI) of 45.0 to 49.9 in adult [facility])  -     Iron; Future  -     Total Iron Binding Capacity; Future  -     Vitamin B1, Whole Blood; Future  -     Vitamin B12; Future  -     Folate; Future  -     Hemoglobin A1C; Future  -     Insulin, Serum; Future  -     Fatty acids, free; Future    Anxiety  -     LORazepam (ATIVAN) 1 MG tablet; Take 1 tablet by mouth 2 times daily for 30 days. 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

## 2023-01-20 LAB
ALBUMIN SERPL-MCNC: 3.6 G/DL (ref 3.5–5)
ALBUMIN/GLOB SERPL: 1.1 (ref 0.4–1.6)
ALP SERPL-CCNC: 261 U/L (ref 50–136)
ALT SERPL-CCNC: 35 U/L (ref 12–65)
AST SERPL-CCNC: 15 U/L (ref 15–37)
BILIRUB DIRECT SERPL-MCNC: <0.1 MG/DL
BILIRUB SERPL-MCNC: 0.5 MG/DL (ref 0.2–1.1)
EST. AVERAGE GLUCOSE BLD GHB EST-MCNC: 114 MG/DL
FOLATE SERPL-MCNC: 5.3 NG/ML (ref 3.1–17.5)
GLOBULIN SER CALC-MCNC: 3.4 G/DL (ref 2.8–4.5)
HBA1C MFR BLD: 5.6 % (ref 4.8–5.6)
IRON SERPL-MCNC: 79 UG/DL (ref 35–150)
PROT SERPL-MCNC: 7 G/DL (ref 6.3–8.2)
TIBC SERPL-MCNC: 341 UG/DL (ref 250–450)
VIT B12 SERPL-MCNC: 538 PG/ML (ref 193–986)

## 2023-01-21 LAB — INSULIN SERPL-ACNC: 74.8 UIU/ML (ref 2.6–24.9)

## 2023-01-23 ENCOUNTER — TELEMEDICINE (OUTPATIENT)
Dept: FAMILY MEDICINE CLINIC | Facility: CLINIC | Age: 48
End: 2023-01-23
Payer: OTHER GOVERNMENT

## 2023-01-23 DIAGNOSIS — Z71.3 KETOGENIC DIET MONITORING ENCOUNTER: Primary | ICD-10-CM

## 2023-01-23 DIAGNOSIS — R74.8 ELEVATED ALKALINE PHOSPHATASE MEASUREMENT: ICD-10-CM

## 2023-01-23 LAB — VIT B1 BLD-SCNC: 125.8 NMOL/L (ref 66.5–200)

## 2023-01-23 PROCEDURE — 99443 PR PHYS/QHP TELEPHONE EVALUATION 21-30 MIN: CPT | Performed by: FAMILY MEDICINE

## 2023-01-23 ASSESSMENT — ENCOUNTER SYMPTOMS
VOMITING: 0
NAUSEA: 0
SHORTNESS OF BREATH: 0

## 2023-01-23 ASSESSMENT — PATIENT HEALTH QUESTIONNAIRE - PHQ9: DEPRESSION UNABLE TO ASSESS: FUNCTIONAL CAPACITY MOTIVATION LIMITS ACCURACY

## 2023-01-23 NOTE — PROGRESS NOTES
PROGRESS NOTE  This visit was conducted via the phone with patient's consent to the visit and all associated charges to reduce the patient's risk of exposure to COVID-19 and continuity of care for an established patient. She and/or her healthcare decision maker is aware that this patient-initiated phone encounter is a billable service, with coverage as determined by her insurance carrier. She is aware that she may receive a bill and has provided verbal consent to proceed: Yes    Vitals and physical exam deferred due to telephone visit. Total Time: minutes: 21-30 minutes. SUBJECTIVE:   Niki Albarado is a 50 y.o. female seen for a follow up visit regarding the following chief complaint:           HPI  Is doing a phone call visit to go over lab results patient wanted to start a ketogenic diet we also had to do some lab work for her Ativan part of it was a hepatic and CBC all came back normal except for an elevated alkaline phosphatase that was 261 reviewing labs in the past they have been mildly elevated we will recheck again and if this continues we will check a hepatitis panel more than likely will need a further work-up    Past Medical History, Past Surgical History, Family history, Social History, and Medications were all reviewed with the patient today and updated as necessary. Current Outpatient Medications   Medication Sig Dispense Refill    zinc gluconate 50 MG tablet Take 50 mg by mouth daily      vitamin C (ASCORBIC ACID) 500 MG tablet Take 500 mg by mouth daily      LORazepam (ATIVAN) 1 MG tablet Take 1 tablet by mouth 2 times daily for 30 days. Max Daily Amount: 2 mg 60 tablet 0    LEVOXYL 150 MCG tablet Take 1 tablet by mouth Daily 30 tablet 5    escitalopram (LEXAPRO) 20 MG tablet Take 1 tablet by mouth daily 90 tablet 3    lisinopril-hydroCHLOROthiazide (PRINZIDE;ZESTORETIC) 20-25 MG per tablet Take 1 tablet by mouth in the morning. TAKE ONE TABLET BY MOUTH ONE TIME DAILY.  90 tablet 3 No current facility-administered medications for this visit. Allergies   Allergen Reactions    Ondansetron Itching    Codeine Nausea And Vomiting     Patient Active Problem List   Diagnosis    Essential hypertension    AR (allergic rhinitis)    Morbid obesity with BMI of 40.0-44.9, adult (HCC)    Daytime sleepiness    KISHA (obstructive sleep apnea)    Primary hypothyroidism    Obstructive sleep apnea on CPAP    Hashimoto's thyroiditis     Past Medical History:   Diagnosis Date    Acute sinusitis     Allergic rhinitis     Dermatitis     Eczema     Generalized anxiety disorder     Hashimoto's thyroiditis     Hypertension     IBS (irritable bowel syndrome)     Obstructive sleep apnea on CPAP     Primary hypothyroidism     Urticaria      Past Surgical History:   Procedure Laterality Date    ABDOMINOPLASTY  2005    BREAST ENHANCEMENT SURGERY  2006     Family History   Problem Relation Age of Onset    Breast Cancer Mother     Hypothyroidism Mother     No Known Problems Father     Hypothyroidism Maternal Grandmother     Hypothyroidism Maternal Aunt     Thyroid Cancer Neg Hx      Social History     Tobacco Use    Smoking status: Former     Packs/day: 0.50     Years: 5.00     Pack years: 2.50     Types: Cigarettes     Quit date: 11/1/2007     Years since quitting: 15.2    Smokeless tobacco: Never   Substance Use Topics    Alcohol use: No         Review of Systems   Constitutional:  Negative for fatigue and fever. Respiratory:  Negative for shortness of breath. Cardiovascular:  Negative for chest pain. Gastrointestinal:  Negative for nausea and vomiting. OBJECTIVE:  There were no vitals taken for this visit. Physical Exam     Medical problems and test results were reviewed with the patient today.      Recent Results (from the past 672 hour(s))   CBC with Auto Differential    Collection Time: 01/19/23 11:12 AM   Result Value Ref Range    WBC 6.3 4.3 - 11.1 K/uL    RBC 4.68 4.05 - 5.2 M/uL    Hemoglobin 13.3 11.7 - 15.4 g/dL    Hematocrit 42.1 35.8 - 46.3 %    MCV 90.0 82 - 102 FL    MCH 28.4 26.1 - 32.9 PG    MCHC 31.6 31.4 - 35.0 g/dL    RDW 13.9 11.9 - 14.6 %    Platelets 279 430 - 385 K/uL    MPV 10.8 9.4 - 12.3 FL    nRBC 0.00 0.0 - 0.2 K/uL    Differential Type AUTOMATED      Seg Neutrophils 58 43 - 78 %    Lymphocytes 30 13 - 44 %    Monocytes 8 4.0 - 12.0 %    Eosinophils % 3 0.5 - 7.8 %    Basophils 1 0.0 - 2.0 %    Immature Granulocytes 0 0.0 - 5.0 %    Segs Absolute 3.7 1.7 - 8.2 K/UL    Absolute Lymph # 1.9 0.5 - 4.6 K/UL    Absolute Mono # 0.5 0.1 - 1.3 K/UL    Absolute Eos # 0.2 0.0 - 0.8 K/UL    Basophils Absolute 0.1 0.0 - 0.2 K/UL    Absolute Immature Granulocyte 0.0 0.0 - 0.5 K/UL   Hepatic Function Panel    Collection Time: 01/19/23 11:12 AM   Result Value Ref Range    Total Protein 7.0 6.3 - 8.2 g/dL    Albumin 3.6 3.5 - 5.0 g/dL    Globulin 3.4 2.8 - 4.5 g/dL    Albumin/Globulin Ratio 1.1 0.4 - 1.6      Total Bilirubin 0.5 0.2 - 1.1 MG/DL    Bilirubin, Direct <0.1 <0.4 MG/DL    Alk Phosphatase 261 (H) 50 - 136 U/L    AST 15 15 - 37 U/L    ALT 35 12 - 65 U/L   Iron    Collection Time: 01/19/23 11:12 AM   Result Value Ref Range    Iron 79 35 - 150 ug/dL   Total Iron Binding Capacity    Collection Time: 01/19/23 11:12 AM   Result Value Ref Range    TIBC 341 250 - 450 ug/dL   Vitamin B12    Collection Time: 01/19/23 11:12 AM   Result Value Ref Range    Vitamin B-12 538 193 - 986 pg/mL   Folate    Collection Time: 01/19/23 11:12 AM   Result Value Ref Range    Folate 5.3 3.1 - 17.5 ng/mL   Hemoglobin A1C    Collection Time: 01/19/23 11:12 AM   Result Value Ref Range    Hemoglobin A1C 5.6 4.8 - 5.6 %    eAG 114 mg/dL   Insulin, Serum    Collection Time: 01/19/23 11:12 AM   Result Value Ref Range    Insulin 74.8 (H) 2.6 - 24.9 uIU/mL       ASSESSMENT and PLAN    Visit Diagnoses and Associated Orders       Ketogenic diet monitoring encounter    -  Primary         Elevated alkaline phosphatase measurement        Alkaline Phosphatase, Isoenzymes [[id number] Custom]   - Future Order    Gamma GT [[id number] Custom]   - Future Order    Hepatitis A Antibody, IgM [[id number] Custom]   - Future Order    Hepatitis B Surface Antigen [[id number] Custom]   - Future Order    Hepatitis B Core Antibody, IgM [[id number] Custom]   - Future Order    Hepatitis C Antibody [[id number] Custom]   - Future Order                     Diagnosis Orders   1. Ketogenic diet monitoring encounter        2. Elevated alkaline phosphatase measurement  Alkaline Phosphatase, Isoenzymes    Gamma GT    Hepatitis A Antibody, IgM    Hepatitis B Surface Antigen    Hepatitis B Core Antibody, IgM    Hepatitis C Antibody      , Diagnoses and all orders for this visit:    Ketogenic diet monitoring encounter    Elevated alkaline phosphatase measurement  -     Alkaline Phosphatase, Isoenzymes; Future  -     Gamma GT; Future  -     Hepatitis A Antibody, IgM; Future  -     Hepatitis B Surface Antigen; Future  -     Hepatitis B Core Antibody, IgM; Future  -     Hepatitis C Antibody; Future  , Reviewed her labs answered all her questions and we will schedule her in 3 to 4 weeks for recheck of any hepatic we will check a hepatitis panel supportive care given precautions given.   We will call her back with results and plan

## 2023-01-24 LAB — NEFA SERPL-SCNC: 0 MEQ/L (ref 0.1–1.1)

## 2023-03-02 ENCOUNTER — OFFICE VISIT (OUTPATIENT)
Dept: ENDOCRINOLOGY | Age: 48
End: 2023-03-02
Payer: OTHER GOVERNMENT

## 2023-03-02 VITALS — WEIGHT: 257 LBS | SYSTOLIC BLOOD PRESSURE: 112 MMHG | BODY MASS INDEX: 45.53 KG/M2 | DIASTOLIC BLOOD PRESSURE: 84 MMHG

## 2023-03-02 DIAGNOSIS — E03.9 PRIMARY HYPOTHYROIDISM: Primary | ICD-10-CM

## 2023-03-02 DIAGNOSIS — E06.3 HASHIMOTO'S THYROIDITIS: ICD-10-CM

## 2023-03-02 DIAGNOSIS — E03.9 PRIMARY HYPOTHYROIDISM: ICD-10-CM

## 2023-03-02 DIAGNOSIS — E66.01 MORBID OBESITY WITH BMI OF 40.0-44.9, ADULT (HCC): ICD-10-CM

## 2023-03-02 PROCEDURE — 3079F DIAST BP 80-89 MM HG: CPT | Performed by: INTERNAL MEDICINE

## 2023-03-02 PROCEDURE — 99213 OFFICE O/P EST LOW 20 MIN: CPT | Performed by: INTERNAL MEDICINE

## 2023-03-02 PROCEDURE — 3074F SYST BP LT 130 MM HG: CPT | Performed by: INTERNAL MEDICINE

## 2023-03-02 RX ORDER — LEVOTHYROXINE SODIUM 150 UG/1
150 TABLET ORAL DAILY
Qty: 30 TABLET | Refills: 11
Start: 2023-03-02 | End: 2023-03-03 | Stop reason: DRUGHIGH

## 2023-03-02 ASSESSMENT — ENCOUNTER SYMPTOMS
CONSTIPATION: 0
DIARRHEA: 0

## 2023-03-02 NOTE — PROGRESS NOTES
Jone Lowery MD, [address]        Reason for visit: Follow-up of hypothyroidism        ASSESSMENT AND PLAN:    1. Primary hypothyroidism  I will check thyroid function test today and notify her of results. Return in 4 months with labs. - LEVOXYL 150 MCG tablet; Take 1 tablet by mouth Daily  Dispense: 30 tablet; Refill: 11  - TSH; Future  - T4, Free; Future  - TSH; Future  - T4, Free; Future    2. Hashimoto's thyroiditis    3. Morbid obesity with BMI of 40.0-44.9, adult [facility])  She feels that she previously benefited from taking phentermine. She is likely a good candidate for weekly GLP-1 receptor agonist therapy ([facility] or [facility]). Defer to Dr. Skyla Cassidy. Follow-up and Dispositions    Return in about 4 months (around 2023). History of Present Illness:    THYROID DYSFUNCTION  Yolanda Coronel is seen for follow-up of hypothyroidism; this was diagnosed in ~, though she indicates that she never had an elevated TSH. Per the chart, she preferred to see Dr. Jcarlos Iyer and was referred there in 2021 but was not offered an appointment. Current symptoms:  See review of systems below    Menses: irregular, oligomenorrheic    Pregnancy: , no history of infertility    Prior treatment: She was started on Synthroid at diagnosis. She has been previously treated with Levoxyl, levothyroxine, and NP Thyroid. Dr. Skyla Cassidy switched her to NP Thyroid in ~.   The most recent therapy adjustments have been as follows:  - NP Thyroid 135 mg daily to 120 mg daily 2022  -Levoxyl 175 mcg daily 2022  -Levoxyl 150 mcg daily 10/30/2022    Pertinent labs:  2016: TSH 0.401.  2016: TSH 0.749, free T4 0.98, T4 6.1, free thyroxine index 1.6.  3/7/2017: TSH 0.344.  3/28/2018: TSH 3.430.  2018: TSH 0.189, free T4 1.14, T4 7.4, free thyroxine index 1.8, free T3 3.4.  2018: TSH 5.820, T4 3.8, free thyroxine index 0.7, T3 162.  1/22/2019: TSH 1.840.  2/10/2020: TSH 1.610.  9/23/2020: TSH 1.530, antithyroid peroxidase antibodies 240 (+). 3/16/2021: TSH 0.779.  4/30/2021: TSH 0.675.  7/27/2021: TSH 0.586.  5/17/2022: TSH less than 0.005.  7/7/2022: TSH 0.006.  10/26/2022: TSH 0.009, free T4 1.2. Imaging: none      Review of Systems   Constitutional:  Positive for fatigue and unexpected weight change (gained 27 pounds in 5 months). Cardiovascular:  Negative for palpitations. Gastrointestinal:  Negative for constipation and diarrhea. Psychiatric/Behavioral:  Positive for suicidal ideas. The patient is nervous/anxious. /84 (Site: Left Upper Arm, Position: Sitting)   Wt 257 lb (116.6 kg)   BMI 45.53 kg/m²   Wt Readings from Last 3 Encounters:   03/02/23 257 lb (116.6 kg)   01/19/23 267 lb (121.1 kg)   11/01/22 254 lb (115.2 kg)       Physical Exam  Constitutional:       Appearance: Normal appearance. HENT:      Head: Normocephalic and atraumatic. Neck:      Thyroid: No thyroid mass or thyromegaly. Cardiovascular:      Rate and Rhythm: Normal rate and regular rhythm. Pulmonary:      Breath sounds: Normal breath sounds. Abdominal:      General: There is no distension. Palpations: Abdomen is soft. Musculoskeletal:         General: Normal range of motion. Skin:     General: Skin is warm and dry. Neurological:      General: No focal deficit present. Mental Status: She is alert. Psychiatric:         Mood and Affect: Mood and affect normal.         Speech: Speech normal.         Behavior: Behavior normal.         Thought Content:  Thought content normal.       Orders Placed This Encounter   Procedures    TSH     Standing Status:   Future     Standing Expiration Date:   3/2/2024    T4, Free     Standing Status:   Future     Standing Expiration Date:   3/2/2024    TSH     Standing Status:   Future     Standing Expiration Date:   3/2/2024    T4, Free     Standing Status:   Future     Standing Expiration Date:   3/2/2024         Current Outpatient Medications   Medication Sig Dispense Refill    LEVOXYL 150 MCG tablet Take 1 tablet by mouth Daily 30 tablet 11    zinc gluconate 50 MG tablet Take 50 mg by mouth daily      vitamin C (ASCORBIC ACID) 500 MG tablet Take 500 mg by mouth daily      escitalopram (LEXAPRO) 20 MG tablet Take 1 tablet by mouth daily 90 tablet 3    lisinopril-hydroCHLOROthiazide (PRINZIDE;ZESTORETIC) 20-25 MG per tablet Take 1 tablet by mouth in the morning. TAKE ONE TABLET BY MOUTH ONE TIME DAILY. 90 tablet 3     No current facility-administered medications for this visit. Zeyad Lutz MD, FACE      Portions of this note were generated with the assistance of voice recognition software. As such, some errors in transcription may be present.

## 2023-03-03 DIAGNOSIS — E03.9 PRIMARY HYPOTHYROIDISM: Primary | ICD-10-CM

## 2023-03-03 LAB
T4 FREE SERPL-MCNC: 1.1 NG/DL (ref 0.78–1.46)
TSH, 3RD GENERATION: 0.05 UIU/ML (ref 0.36–3.74)

## 2023-03-03 RX ORDER — LEVOTHYROXINE SODIUM 137 UG/1
137 TABLET ORAL DAILY
Qty: 90 TABLET | Refills: 3 | Status: SHIPPED | OUTPATIENT
Start: 2023-03-03

## 2023-03-03 NOTE — PROGRESS NOTES
My Chart message patient:    Thank you for checking labs. You remain overtreated with Levoxyl. Please decrease your dose from 150 mcg daily to 137 mcg daily. I sent a prescription to your pharmacy. Please return to the lab in my building or another [address] [place] [address] lab in about 6 weeks to repeat labs. Let me know if you have questions.

## 2023-03-06 ENCOUNTER — NURSE ONLY (OUTPATIENT)
Dept: FAMILY MEDICINE CLINIC | Facility: CLINIC | Age: 48
End: 2023-03-06

## 2023-03-06 DIAGNOSIS — R74.8 ELEVATED ALKALINE PHOSPHATASE MEASUREMENT: ICD-10-CM

## 2023-03-07 LAB
GGT SERPL-CCNC: 18 U/L (ref 5–55)
HAV IGM SER QL: NONREACTIVE
HBV SURFACE AG SER QL: NONREACTIVE
HCV AB SER QL: NONREACTIVE

## 2023-03-08 LAB — HBV CORE IGM SERPL QL IA: NEGATIVE

## 2023-03-11 LAB
ALP BONE CFR SERPL: 44 % (ref 14–68)
ALP INTEST CFR SERPL: 4 % (ref 0–18)
ALP LIVER CFR SERPL: 52 % (ref 18–85)
ALP SERPL-CCNC: 253 IU/L (ref 44–121)

## 2023-04-18 ENCOUNTER — TELEPHONE (OUTPATIENT)
Dept: FAMILY MEDICINE CLINIC | Facility: CLINIC | Age: 48
End: 2023-04-18

## 2023-04-18 DIAGNOSIS — E66.01 CLASS 3 SEVERE OBESITY DUE TO EXCESS CALORIES WITH SERIOUS COMORBIDITY AND BODY MASS INDEX (BMI) OF 45.0 TO 49.9 IN ADULT (HCC): Primary | ICD-10-CM

## 2023-04-18 RX ORDER — METFORMIN HYDROCHLORIDE 750 MG/1
TABLET, EXTENDED RELEASE ORAL
Qty: 180 TABLET | Refills: 1 | Status: SHIPPED | OUTPATIENT
Start: 2023-04-18

## 2023-05-02 DIAGNOSIS — E03.9 PRIMARY HYPOTHYROIDISM: ICD-10-CM

## 2023-05-03 RX ORDER — LEVOTHYROXINE SODIUM 150 UG/1
TABLET ORAL
Qty: 30 TABLET | Refills: 5 | OUTPATIENT
Start: 2023-05-03

## 2023-05-08 NOTE — PROGRESS NOTES
HPI:  Ms. Constance Ortega is a 50 y.o.   OB History    No obstetric history on file. who is here today for a New Patient well woman exam. 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 (Menstrual status: Other - See Notes). Pt has not had a cycle since  but has had some intermittent spotting since. Past Medical History:  Past Medical History:   Diagnosis Date    Acute sinusitis     Allergic rhinitis     Dermatitis     Eczema     Generalized anxiety disorder     Hashimoto's thyroiditis     Hypertension     IBS (irritable bowel syndrome)     Obstructive sleep apnea on CPAP     Primary hypothyroidism     Urticaria        Past Surgical History:  Past Surgical History:   Procedure Laterality Date    ABDOMINOPLASTY  2005    BREAST ENHANCEMENT SURGERY  2006       Allergies:    Allergies   Allergen Reactions    Ondansetron Itching       Medication History:  Current Outpatient Medications   Medication Sig Dispense Refill    metFORMIN (GLUCOPHAGE-XR) 750 MG extended release tablet 1 p.o. daily for 2 weeks then 1 p.o. twice daily 180 tablet 1    Semaglutide,0.25 or 0.5MG/DOS, 2 MG/1.5ML SOPN 0.25 mg subcu weekly x4 weeks 1 Adjustable Dose Pre-filled Pen Syringe 5    LEVOXYL 137 MCG tablet Take 1 tablet by mouth Daily 90 tablet 3    zinc gluconate 50 MG tablet Take 1 tablet by mouth daily      vitamin C

## 2023-05-09 ENCOUNTER — OFFICE VISIT (OUTPATIENT)
Dept: OBGYN CLINIC | Age: 48
End: 2023-05-09
Payer: OTHER GOVERNMENT

## 2023-05-09 VITALS
HEIGHT: 64 IN | DIASTOLIC BLOOD PRESSURE: 82 MMHG | BODY MASS INDEX: 43.77 KG/M2 | SYSTOLIC BLOOD PRESSURE: 128 MMHG | WEIGHT: 256.4 LBS

## 2023-05-09 DIAGNOSIS — Z12.31 SCREENING MAMMOGRAM FOR BREAST CANCER: ICD-10-CM

## 2023-05-09 DIAGNOSIS — Z13.89 SCREENING FOR GENITOURINARY CONDITION: ICD-10-CM

## 2023-05-09 DIAGNOSIS — Z11.51 SCREENING FOR HUMAN PAPILLOMAVIRUS (HPV): ICD-10-CM

## 2023-05-09 DIAGNOSIS — Z12.11 ENCOUNTER FOR SCREENING COLONOSCOPY: ICD-10-CM

## 2023-05-09 DIAGNOSIS — Z12.4 SCREENING FOR CERVICAL CANCER: ICD-10-CM

## 2023-05-09 DIAGNOSIS — Z01.419 WELL WOMAN EXAM: Primary | ICD-10-CM

## 2023-05-09 LAB
BILIRUBIN, URINE, POC: NEGATIVE
BLOOD URINE, POC: NORMAL
GLUCOSE URINE, POC: NEGATIVE
KETONES, URINE, POC: NEGATIVE
LEUKOCYTE ESTERASE, URINE, POC: NEGATIVE
NITRITE, URINE, POC: NEGATIVE
PH, URINE, POC: 6 (ref 4.6–8)
PROTEIN,URINE, POC: NEGATIVE
SPECIFIC GRAVITY, URINE, POC: 1 (ref 1–1.03)
URINALYSIS CLARITY, POC: CLEAR
URINALYSIS COLOR, POC: YELLOW
UROBILINOGEN, POC: NORMAL

## 2023-05-09 PROCEDURE — 3074F SYST BP LT 130 MM HG: CPT | Performed by: OBSTETRICS & GYNECOLOGY

## 2023-05-09 PROCEDURE — 3079F DIAST BP 80-89 MM HG: CPT | Performed by: OBSTETRICS & GYNECOLOGY

## 2023-05-09 PROCEDURE — 81002 URINALYSIS NONAUTO W/O SCOPE: CPT | Performed by: OBSTETRICS & GYNECOLOGY

## 2023-05-09 PROCEDURE — 99386 PREV VISIT NEW AGE 40-64: CPT | Performed by: OBSTETRICS & GYNECOLOGY

## 2023-05-16 LAB
CYTOLOGIST CVX/VAG CYTO: NORMAL
CYTOLOGY CVX/VAG DOC THIN PREP: NORMAL
HPV APTIMA: NEGATIVE
Lab: NORMAL
PATH REPORT.FINAL DX SPEC: NORMAL
STAT OF ADQ CVX/VAG CYTO-IMP: NORMAL

## 2023-06-15 PROBLEM — N93.8 DUB (DYSFUNCTIONAL UTERINE BLEEDING): Status: ACTIVE | Noted: 2023-06-15

## 2023-06-21 NOTE — PROCEDURE: PATHOLOGY BILLING
Immunohistochemistry (11214 and 35712) billing is not performed here. Please use the Immunohistochemistry Stain Billing plan to accomplish this. Immunohistochemistry (74330 and 37474) billing is not performed here. Please use the Immunohistochemistry Stain Billing plan to accomplish this. Finasteride Pregnancy And Lactation Text: This medication is absolutely contraindicated during pregnancy. It is unknown if it is excreted in breast milk.

## 2023-08-09 DIAGNOSIS — I10 ESSENTIAL HYPERTENSION: ICD-10-CM

## 2023-08-10 RX ORDER — LISINOPRIL AND HYDROCHLOROTHIAZIDE 25; 20 MG/1; MG/1
1 TABLET ORAL DAILY
Qty: 30 TABLET | Refills: 0 | Status: SHIPPED | OUTPATIENT
Start: 2023-08-10

## 2023-08-16 ENCOUNTER — OFFICE VISIT (OUTPATIENT)
Dept: FAMILY MEDICINE CLINIC | Facility: CLINIC | Age: 48
End: 2023-08-16
Payer: OTHER GOVERNMENT

## 2023-08-16 DIAGNOSIS — F41.9 ANXIETY: ICD-10-CM

## 2023-08-16 DIAGNOSIS — E66.01 CLASS 3 SEVERE OBESITY DUE TO EXCESS CALORIES WITH SERIOUS COMORBIDITY AND BODY MASS INDEX (BMI) OF 45.0 TO 49.9 IN ADULT (HCC): ICD-10-CM

## 2023-08-16 DIAGNOSIS — Z91.199 NONCOMPLIANCE: ICD-10-CM

## 2023-08-16 DIAGNOSIS — Z12.11 SPECIAL SCREENING FOR MALIGNANT NEOPLASMS, COLON: ICD-10-CM

## 2023-08-16 DIAGNOSIS — I10 ESSENTIAL HYPERTENSION: Primary | ICD-10-CM

## 2023-08-16 PROCEDURE — 99215 OFFICE O/P EST HI 40 MIN: CPT | Performed by: FAMILY MEDICINE

## 2023-08-16 RX ORDER — LISINOPRIL AND HYDROCHLOROTHIAZIDE 25; 20 MG/1; MG/1
1 TABLET ORAL DAILY
Qty: 90 TABLET | Refills: 1 | Status: SHIPPED | OUTPATIENT
Start: 2023-08-16

## 2023-08-16 RX ORDER — ESCITALOPRAM OXALATE 20 MG/1
20 TABLET ORAL DAILY
Qty: 90 TABLET | Refills: 1 | Status: SHIPPED | OUTPATIENT
Start: 2023-08-16

## 2023-08-16 RX ORDER — METFORMIN HYDROCHLORIDE 750 MG/1
750 TABLET, EXTENDED RELEASE ORAL 2 TIMES DAILY
Qty: 180 TABLET | Refills: 1 | Status: CANCELLED | OUTPATIENT
Start: 2023-08-16 | End: 2023-11-14

## 2023-08-16 NOTE — PROGRESS NOTES
PROGRESS NOTE    SUBJECTIVE:   Rosibel Saucedo is a 46 y.o. female seen for a follow up visit regarding the following chief complaint:     Chief Complaint   Patient presents with    Weight Management    Medication Refill           HPI patient presents the office today and wants to discuss changing her medication because metformin is not helping to make her lose weight also needs lab work since she missed her appointment for her physical and needs refills on her medication      Past Medical History, Past Surgical History, Family history, Social History, and Medications were all reviewed with the patient today and updated as necessary. Current Outpatient Medications   Medication Sig Dispense Refill    lisinopril-hydroCHLOROthiazide (PRINZIDE;ZESTORETIC) 20-25 MG per tablet Take 1 tablet by mouth daily 90 tablet 1    escitalopram (LEXAPRO) 20 MG tablet Take 1 tablet by mouth daily 90 tablet 1    Semaglutide,0.25 or 0.5MG/DOS, 2 MG/1.5ML SOPN . 25 mg subcu weekly x4 weeks 1 Adjustable Dose Pre-filled Pen Syringe 5    LEVOXYL 137 MCG tablet Take 1 tablet by mouth Daily 90 tablet 3    zinc gluconate 50 MG tablet Take 1 tablet by mouth daily      vitamin C (ASCORBIC ACID) 500 MG tablet Take 1 tablet by mouth daily       No current facility-administered medications for this visit.      Allergies   Allergen Reactions    Ondansetron Itching     Patient Active Problem List   Diagnosis    Essential hypertension    AR (allergic rhinitis)    Morbid obesity with BMI of 40.0-44.9, adult (HCC)    Daytime sleepiness    KISHA (obstructive sleep apnea)    Primary hypothyroidism    Obstructive sleep apnea on CPAP    Hashimoto's thyroiditis    DUB (dysfunctional uterine bleeding)     Past Medical History:   Diagnosis Date    Acute sinusitis     Allergic rhinitis     Dermatitis     Eczema     Generalized anxiety disorder     Hashimoto's thyroiditis     Hypertension     IBS (irritable bowel syndrome)     Obstructive sleep apnea on CPAP

## 2023-08-17 DIAGNOSIS — F41.9 ANXIETY: ICD-10-CM

## 2023-08-17 RX ORDER — ESCITALOPRAM OXALATE 20 MG/1
TABLET ORAL
Qty: 90 TABLET | Refills: 3 | OUTPATIENT
Start: 2023-08-17

## 2023-08-21 DIAGNOSIS — F41.9 ANXIETY: ICD-10-CM

## 2023-08-21 RX ORDER — ESCITALOPRAM OXALATE 20 MG/1
TABLET ORAL
Qty: 90 TABLET | Refills: 3 | OUTPATIENT
Start: 2023-08-21

## 2023-08-22 ENCOUNTER — TELEPHONE (OUTPATIENT)
Dept: FAMILY MEDICINE CLINIC | Facility: CLINIC | Age: 48
End: 2023-08-22

## 2023-08-22 DIAGNOSIS — F41.9 ANXIETY: Primary | ICD-10-CM

## 2023-08-22 DIAGNOSIS — E66.01 CLASS 3 SEVERE OBESITY DUE TO EXCESS CALORIES WITH SERIOUS COMORBIDITY AND BODY MASS INDEX (BMI) OF 45.0 TO 49.9 IN ADULT (HCC): ICD-10-CM

## 2023-08-22 RX ORDER — BUPROPION HYDROCHLORIDE 150 MG/1
150 TABLET ORAL EVERY MORNING
Qty: 30 TABLET | Refills: 1 | Status: SHIPPED | OUTPATIENT
Start: 2023-08-22

## 2023-08-30 DIAGNOSIS — E03.9 PRIMARY HYPOTHYROIDISM: ICD-10-CM

## 2023-08-31 RX ORDER — LEVOTHYROXINE SODIUM 137 UG/1
137 TABLET ORAL DAILY
Qty: 90 TABLET | Refills: 3 | OUTPATIENT
Start: 2023-08-31

## 2023-10-20 NOTE — PROGRESS NOTES
The patient is a 46 y.o. [id number] who is seen for Ultrasound secondary to DUB. Pt last seen 6/15/23. US findings from today 10/23/23:    History: She has had abnormal bleeding since  when she started having thyroid issues. Has not had any bleeding   since her last office visit. Comparison: US 6/15/23---Questionable cervical polyp, two small fibroids that measured 1.3/.9/.8cm and 1.5/.7/1.4cm   along with adeno. Rt small lipoma that measured . 3/.3/.4cm.   -----------------------------------------------------------------------   Enlarged Uterus = 5wks with diffuse adenomyosis   Endo = 1.5mm and appears normal   Rt Ovary appears normal with small questionable lipoma still noted with no significant change. Lt Ovary appears normal   No free fluid seen  Uterine volume: 75.81    US findings from 6/15/23:  [facility] performed secondary to DUB  Probable polyp visualized mid cx measuring 0.4 x 0.3 x 0.4 cm with feeder vessel visualzied. O/W cs WNL  Uterus is anteverted and heterogenous. Areas of focal adenomyosis noted. Small fibroids noted fundal and intramural.  F1= 1.3 x 0.9 x 0.8 cm  F2= 1.3 x 0.7 x 1.4 cm  Endo= 4.4mm,no intracavitary masses visualized. Calcifications noted around border, c/w focal adenomyosis  ROV visualized with small follicles and avascular, echogenic mass measuring 0.3 x 0.3 x 0.4 cm. Possible small lipoma   LOV visualized with small follicles and WNL  No aDN masses or fluid seen  Uterine volume: 111.45     HISTORY:      No LMP recorded. (Menstrual status: Other - See Notes).     Current Outpatient Medications on File Prior to Visit   Medication Sig Dispense Refill    Ascorbic Acid (VITAMIN C PO) Take by mouth      lisinopril-hydroCHLOROthiazide (PRINZIDE;ZESTORETIC) 20-25 MG per tablet Take 1 tablet by mouth daily 90 tablet 1    escitalopram (LEXAPRO) 20 MG tablet Take 1 tablet by mouth daily 90 tablet 1    LEVOXYL 137 MCG tablet Take 1 tablet by mouth Daily 90 tablet 3    zinc

## 2023-10-23 ENCOUNTER — OFFICE VISIT (OUTPATIENT)
Dept: FAMILY MEDICINE CLINIC | Facility: CLINIC | Age: 48
End: 2023-10-23
Payer: OTHER GOVERNMENT

## 2023-10-23 ENCOUNTER — OFFICE VISIT (OUTPATIENT)
Dept: OBGYN CLINIC | Age: 48
End: 2023-10-23

## 2023-10-23 VITALS
SYSTOLIC BLOOD PRESSURE: 122 MMHG | BODY MASS INDEX: 44.39 KG/M2 | HEIGHT: 64 IN | OXYGEN SATURATION: 99 % | WEIGHT: 260 LBS | HEART RATE: 72 BPM | RESPIRATION RATE: 22 BRPM | DIASTOLIC BLOOD PRESSURE: 76 MMHG

## 2023-10-23 VITALS — DIASTOLIC BLOOD PRESSURE: 82 MMHG | BODY MASS INDEX: 44.85 KG/M2 | WEIGHT: 261.3 LBS | SYSTOLIC BLOOD PRESSURE: 128 MMHG

## 2023-10-23 DIAGNOSIS — R40.0 DAYTIME SOMNOLENCE: Primary | ICD-10-CM

## 2023-10-23 DIAGNOSIS — E66.01 CLASS 3 SEVERE OBESITY DUE TO EXCESS CALORIES WITH SERIOUS COMORBIDITY AND BODY MASS INDEX (BMI) OF 45.0 TO 49.9 IN ADULT (HCC): ICD-10-CM

## 2023-10-23 DIAGNOSIS — N93.8 DUB (DYSFUNCTIONAL UTERINE BLEEDING): Primary | ICD-10-CM

## 2023-10-23 PROCEDURE — 3074F SYST BP LT 130 MM HG: CPT | Performed by: FAMILY MEDICINE

## 2023-10-23 PROCEDURE — 99214 OFFICE O/P EST MOD 30 MIN: CPT | Performed by: FAMILY MEDICINE

## 2023-10-23 PROCEDURE — 3078F DIAST BP <80 MM HG: CPT | Performed by: FAMILY MEDICINE

## 2023-10-23 RX ORDER — DEXTROAMPHETAMINE SACCHARATE, AMPHETAMINE ASPARTATE, DEXTROAMPHETAMINE SULFATE AND AMPHETAMINE SULFATE 5; 5; 5; 5 MG/1; MG/1; MG/1; MG/1
20 TABLET ORAL DAILY
Qty: 30 TABLET | Refills: 0 | Status: SHIPPED | OUTPATIENT
Start: 2023-10-23 | End: 2023-11-23

## 2023-10-23 ASSESSMENT — ENCOUNTER SYMPTOMS
ABDOMINAL DISTENTION: 0
VOMITING: 0
DIARRHEA: 0
COLOR CHANGE: 0
ABDOMINAL PAIN: 0
NAUSEA: 0
SHORTNESS OF BREATH: 0
APNEA: 0

## 2023-10-23 NOTE — PROGRESS NOTES
opted to try her on Adderall which will help with her weight also help with her daytime somnolence supportive care given precautions given discussed how she should go through [address] [place] [address] before she runs out of her medication and recommended holidays from her Adderall as well as 6-month recheck

## 2023-12-04 DIAGNOSIS — R40.0 DAYTIME SOMNOLENCE: ICD-10-CM

## 2023-12-05 RX ORDER — DEXTROAMPHETAMINE SACCHARATE, AMPHETAMINE ASPARTATE, DEXTROAMPHETAMINE SULFATE AND AMPHETAMINE SULFATE 5; 5; 5; 5 MG/1; MG/1; MG/1; MG/1
20 TABLET ORAL 2 TIMES DAILY
Qty: 60 TABLET | Refills: 0 | OUTPATIENT
Start: 2023-12-05 | End: 2024-01-04

## 2023-12-18 DIAGNOSIS — R40.0 DAYTIME SOMNOLENCE: ICD-10-CM

## 2023-12-18 RX ORDER — DEXTROAMPHETAMINE SACCHARATE, AMPHETAMINE ASPARTATE, DEXTROAMPHETAMINE SULFATE AND AMPHETAMINE SULFATE 5; 5; 5; 5 MG/1; MG/1; MG/1; MG/1
20 TABLET ORAL 2 TIMES DAILY
Qty: 60 TABLET | Refills: 0 | OUTPATIENT
Start: 2023-12-18 | End: 2024-01-17

## 2024-01-03 DIAGNOSIS — E03.9 PRIMARY HYPOTHYROIDISM: ICD-10-CM

## 2024-01-03 LAB
T4 FREE SERPL-MCNC: 1 NG/DL (ref 0.78–1.46)
TSH, 3RD GENERATION: 0.09 UIU/ML (ref 0.36–3.74)

## 2024-01-04 ENCOUNTER — OFFICE VISIT (OUTPATIENT)
Dept: ENDOCRINOLOGY | Age: 49
End: 2024-01-04
Payer: OTHER GOVERNMENT

## 2024-01-04 VITALS
OXYGEN SATURATION: 99 % | WEIGHT: 244 LBS | SYSTOLIC BLOOD PRESSURE: 118 MMHG | BODY MASS INDEX: 41.88 KG/M2 | HEART RATE: 84 BPM | DIASTOLIC BLOOD PRESSURE: 86 MMHG

## 2024-01-04 DIAGNOSIS — E03.9 PRIMARY HYPOTHYROIDISM: Primary | ICD-10-CM

## 2024-01-04 DIAGNOSIS — E06.3 HASHIMOTO'S THYROIDITIS: ICD-10-CM

## 2024-01-04 DIAGNOSIS — E66.01 MORBID OBESITY WITH BMI OF 40.0-44.9, ADULT (HCC): ICD-10-CM

## 2024-01-04 PROCEDURE — 3079F DIAST BP 80-89 MM HG: CPT | Performed by: INTERNAL MEDICINE

## 2024-01-04 PROCEDURE — 99213 OFFICE O/P EST LOW 20 MIN: CPT | Performed by: INTERNAL MEDICINE

## 2024-01-04 PROCEDURE — 3074F SYST BP LT 130 MM HG: CPT | Performed by: INTERNAL MEDICINE

## 2024-01-04 RX ORDER — LEVOTHYROXINE SODIUM 125 UG/1
125 TABLET ORAL DAILY
Qty: 30 TABLET | Refills: 3 | Status: SHIPPED | OUTPATIENT
Start: 2024-01-04

## 2024-01-04 ASSESSMENT — ENCOUNTER SYMPTOMS
CONSTIPATION: 0
DIARRHEA: 0

## 2024-01-04 NOTE — PROGRESS NOTES
FERNANDA Jeffries MD, [facility] ENDOCRINOLOGY   AND   THYROID [facility]        Reason for visit: Follow-up of hypothyroidism        ASSESSMENT AND PLAN:    1. Primary hypothyroidism  She remains overtreated.  I will reduce her Levoxyl dose as below.  Reassess in 3 months.  - LEVOXYL 125 MCG tablet; Take 1 tablet by mouth Daily  Dispense: 30 tablet; Refill: 3  - TSH; Future  - T4, Free; Future    2. Hashimoto's thyroiditis    3. Morbid obesity with BMI of 40.0-44.9, adult ([facility])      Follow-up and Dispositions    Return in about 3 months (around 2024).           History of Present Illness:    THYROID DYSFUNCTION  Karina Snell is seen for follow-up of hypothyroidism; this was diagnosed in ~, though she indicates that she never had an elevated TSH.  I last saw her in 2023.  She was to have followed up with me in 2023 but canceled the appointment.    Current symptoms:  See review of systems below    Menses: irregular, oligomenorrheic    Pregnancy: , no history of infertility    Prior treatment: She was started on Synthroid at diagnosis.  She has been previously treated with Levoxyl, levothyroxine, and NP Thyroid.  Dr. Linton switched her to NP Thyroid in ~.  The most recent therapy adjustments have been as follows:  - NP Thyroid 135 mg daily to 120 mg daily 2022  -Levoxyl 175 mcg daily 2022  -Levoxyl 150 mcg daily 10/30/2022  -Levoxyl 137 mcg daily 3/3/2023    Pertinent labs:  2016: TSH 0.401.  2016: TSH 0.749, free T4 0.98, T4 6.1, free thyroxine index 1.6.  3/7/2017: TSH 0.344.  3/28/2018: TSH 3.430.  2018: TSH 0.189, free T4 1.14, T4 7.4, free thyroxine index 1.8, free T3 3.4.  2018: TSH 5.820, T4 3.8, free thyroxine index 0.7, T3 162.  2019: TSH 1.840.  2/10/2020: TSH 1.610.  2020: TSH 1.530, antithyroid peroxidase antibodies 240 (+).  3/16/2021: TSH 0.779.  2021: TSH 0.675.  2021: TSH 0.586.  2022: TSH less than

## 2024-01-11 ENCOUNTER — NURSE ONLY (OUTPATIENT)
Dept: FAMILY MEDICINE CLINIC | Facility: CLINIC | Age: 49
End: 2024-01-11
Payer: OTHER GOVERNMENT

## 2024-01-11 DIAGNOSIS — E55.9 VITAMIN D DEFICIENCY: ICD-10-CM

## 2024-01-11 DIAGNOSIS — Z00.00 LABORATORY EXAMINATION ORDERED AS PART OF A ROUTINE GENERAL MEDICAL EXAMINATION: Primary | ICD-10-CM

## 2024-01-11 LAB
25(OH)D3 SERPL-MCNC: 55.8 NG/ML (ref 30–100)
ALBUMIN SERPL-MCNC: 3.9 G/DL (ref 3.5–5)
ALBUMIN/GLOB SERPL: 1.3 (ref 0.4–1.6)
ALP SERPL-CCNC: 177 U/L (ref 50–136)
ALT SERPL-CCNC: 54 U/L (ref 12–65)
ANION GAP SERPL CALC-SCNC: 3 MMOL/L (ref 2–11)
AST SERPL-CCNC: 25 U/L (ref 15–37)
BILIRUB SERPL-MCNC: 0.5 MG/DL (ref 0.2–1.1)
BILIRUBIN, URINE, POC: NEGATIVE
BLOOD URINE, POC: NEGATIVE
BUN SERPL-MCNC: 21 MG/DL (ref 6–23)
CALCIUM SERPL-MCNC: 9.3 MG/DL (ref 8.3–10.4)
CHLORIDE SERPL-SCNC: 109 MMOL/L (ref 103–113)
CHOLEST SERPL-MCNC: 171 MG/DL
CO2 SERPL-SCNC: 30 MMOL/L (ref 21–32)
CREAT SERPL-MCNC: 0.7 MG/DL (ref 0.6–1)
GLOBULIN SER CALC-MCNC: 3 G/DL (ref 2.8–4.5)
GLUCOSE SERPL-MCNC: 100 MG/DL (ref 65–100)
GLUCOSE URINE, POC: NEGATIVE
GRANS ABSOLUTE, POC: 2.4 K/UL
GRANULOCYTES %, POC: 47 %
HDLC SERPL-MCNC: 53 MG/DL (ref 40–60)
HDLC SERPL: 3.2
HEMATOCRIT, POC: 42 %
HEMOGLOBIN, POC: 13.6 G/DL
KETONES, URINE, POC: NEGATIVE
LDLC SERPL CALC-MCNC: 107.2 MG/DL
LEUKOCYTE ESTERASE, URINE, POC: NEGATIVE
LYMPHOCYTE %, POC: 43 %
LYMPHS ABSOLUTE, POC: 2.2 K/UL
MCH, POC: NORMAL PG (ref 40–?)
MCHC, POC: 32.4
MCV, POC: 90.1
MONOCYTE %, POC: 10 %
MONOCYTE, ABSOLUTE POC: 0.5 K/UL
MPV, POC: 8.3 FL
NITRITE, URINE, POC: NEGATIVE
PH, URINE, POC: 6 (ref 4.6–8)
PLATELET COUNT, POC: 267 K/UL
POTASSIUM SERPL-SCNC: 3.7 MMOL/L (ref 3.5–5.1)
PROT SERPL-MCNC: 6.9 G/DL (ref 6.3–8.2)
PROTEIN,URINE, POC: NEGATIVE
RBC, POC: 4.66 M/UL
RDW, POC: 12.8 %
SODIUM SERPL-SCNC: 142 MMOL/L (ref 136–146)
SPECIFIC GRAVITY, URINE, POC: 1.02 (ref 1–1.03)
TRIGL SERPL-MCNC: 54 MG/DL (ref 35–150)
TSH, 3RD GENERATION: 0.2 UIU/ML (ref 0.36–3.74)
URINALYSIS CLARITY, POC: CLEAR
URINALYSIS COLOR, POC: YELLOW
UROBILINOGEN, POC: NORMAL
VLDLC SERPL CALC-MCNC: 10.8 MG/DL (ref 6–23)
WBC, POC: 5.2 K/UL

## 2024-01-11 PROCEDURE — 81003 URINALYSIS AUTO W/O SCOPE: CPT | Performed by: FAMILY MEDICINE

## 2024-01-11 PROCEDURE — 85025 COMPLETE CBC W/AUTO DIFF WBC: CPT | Performed by: FAMILY MEDICINE

## 2024-01-23 ENCOUNTER — OFFICE VISIT (OUTPATIENT)
Dept: FAMILY MEDICINE CLINIC | Facility: CLINIC | Age: 49
End: 2024-01-23
Payer: OTHER GOVERNMENT

## 2024-01-23 VITALS — BODY MASS INDEX: 41.88 KG/M2 | WEIGHT: 244 LBS

## 2024-01-23 DIAGNOSIS — E55.9 VITAMIN D DEFICIENCY: ICD-10-CM

## 2024-01-23 DIAGNOSIS — E66.01 CLASS 3 SEVERE OBESITY DUE TO EXCESS CALORIES WITH SERIOUS COMORBIDITY AND BODY MASS INDEX (BMI) OF 45.0 TO 49.9 IN ADULT (HCC): ICD-10-CM

## 2024-01-23 DIAGNOSIS — R40.0 DAYTIME SOMNOLENCE: ICD-10-CM

## 2024-01-23 DIAGNOSIS — Z00.00 ROUTINE GENERAL MEDICAL EXAMINATION AT A HEALTH CARE FACILITY: Primary | ICD-10-CM

## 2024-01-23 DIAGNOSIS — I10 ESSENTIAL HYPERTENSION: ICD-10-CM

## 2024-01-23 DIAGNOSIS — E03.9 ACQUIRED HYPOTHYROIDISM: ICD-10-CM

## 2024-01-23 DIAGNOSIS — F41.9 ANXIETY: ICD-10-CM

## 2024-01-23 DIAGNOSIS — Z13.31 SCREENING FOR DEPRESSION: ICD-10-CM

## 2024-01-23 DIAGNOSIS — E78.01 FAMILIAL HYPERCHOLESTEROLEMIA: ICD-10-CM

## 2024-01-23 PROCEDURE — 99396 PREV VISIT EST AGE 40-64: CPT | Performed by: FAMILY MEDICINE

## 2024-01-23 ASSESSMENT — ENCOUNTER SYMPTOMS
SHORTNESS OF BREATH: 0
COUGH: 0
ABDOMINAL PAIN: 0

## 2024-01-23 NOTE — PROGRESS NOTES
PROGRESS NOTE    SUBJECTIVE:   Karina Snell is a 48 y.o. female seen for a follow up visit regarding the following chief complaint:     Chief Complaint   Patient presents with    Annual Exam           HPI patient presents to the office today for complete physical complaining of a lot of stress at home with her middle child having mental health issues at home and is finding it hard to be on medication and continue with counseling      Past Medical History, Past Surgical History, Family history, Social History, and Medications were all reviewed with the patient today and updated as necessary.       Current Outpatient Medications   Medication Sig Dispense Refill    LEVOXYL 125 MCG tablet Take 1 tablet by mouth Daily 30 tablet 3    amphetamine-dextroamphetamine (ADDERALL, 20MG,) 20 MG tablet Take 1 tablet by mouth 2 times daily for 30 days. Max Daily Amount: 40 mg 60 tablet 0    lisinopril-hydroCHLOROthiazide (PRINZIDE;ZESTORETIC) 20-25 MG per tablet Take 1 tablet by mouth daily 90 tablet 1    escitalopram (LEXAPRO) 20 MG tablet Take 1 tablet by mouth daily 90 tablet 1    Ascorbic Acid (VITAMIN C PO) Take by mouth      zinc gluconate 50 MG tablet Take 1 tablet by mouth daily      vitamin C (ASCORBIC ACID) 500 MG tablet Take 1 tablet by mouth daily       No current facility-administered medications for this visit.     Allergies   Allergen Reactions    Bupropion Shortness Of Breath and Anxiety    Ondansetron Itching     Patient Active Problem List   Diagnosis    Essential hypertension    AR (allergic rhinitis)    Morbid obesity with BMI of 40.0-44.9, adult (HCC)    Daytime sleepiness    KISHA (obstructive sleep apnea)    Primary hypothyroidism    Obstructive sleep apnea on CPAP    Hashimoto's thyroiditis    DUB (dysfunctional uterine bleeding)     Past Medical History:   Diagnosis Date    Acute sinusitis     Allergic rhinitis     Dermatitis     Eczema     Generalized anxiety disorder     Hashimoto's thyroiditis

## 2024-01-29 ENCOUNTER — TELEPHONE (OUTPATIENT)
Dept: FAMILY MEDICINE CLINIC | Facility: CLINIC | Age: 49
End: 2024-01-29

## 2024-01-29 DIAGNOSIS — F41.9 ANXIETY: Primary | ICD-10-CM

## 2024-01-29 RX ORDER — LORAZEPAM 1 MG/1
1 TABLET ORAL EVERY 8 HOURS PRN
Qty: 90 TABLET | Refills: 0 | Status: SHIPPED | OUTPATIENT
Start: 2024-01-29 | End: 2024-02-28

## 2024-02-07 DIAGNOSIS — R40.0 DAYTIME SOMNOLENCE: ICD-10-CM

## 2024-02-08 RX ORDER — DEXTROAMPHETAMINE SACCHARATE, AMPHETAMINE ASPARTATE, DEXTROAMPHETAMINE SULFATE AND AMPHETAMINE SULFATE 5; 5; 5; 5 MG/1; MG/1; MG/1; MG/1
20 TABLET ORAL 2 TIMES DAILY
Qty: 60 TABLET | Refills: 0 | Status: SHIPPED | OUTPATIENT
Start: 2024-02-08 | End: 2024-03-09

## 2024-02-15 DIAGNOSIS — R40.0 DAYTIME SOMNOLENCE: ICD-10-CM

## 2024-02-19 RX ORDER — DEXTROAMPHETAMINE SACCHARATE, AMPHETAMINE ASPARTATE, DEXTROAMPHETAMINE SULFATE AND AMPHETAMINE SULFATE 5; 5; 5; 5 MG/1; MG/1; MG/1; MG/1
20 TABLET ORAL 2 TIMES DAILY
Qty: 60 TABLET | Refills: 0 | Status: SHIPPED | OUTPATIENT
Start: 2024-03-09 | End: 2024-04-08

## 2024-03-01 DIAGNOSIS — F41.9 ANXIETY: ICD-10-CM

## 2024-03-01 RX ORDER — LORAZEPAM 1 MG/1
1 TABLET ORAL 2 TIMES DAILY
Qty: 60 TABLET | Refills: 0 | Status: CANCELLED | OUTPATIENT
Start: 2024-03-01 | End: 2024-03-31

## 2024-03-04 NOTE — TELEPHONE ENCOUNTER
Called patient no answer left message to call our office regard Rx refill per Dr Linton patient take it PRN . 01/29/2024 she states that this Rx will last for a whole year

## 2024-03-27 ENCOUNTER — APPOINTMENT (RX ONLY)
Dept: URBAN - METROPOLITAN AREA CLINIC 330 | Facility: CLINIC | Age: 49
Setting detail: DERMATOLOGY
End: 2024-03-27

## 2024-03-27 DIAGNOSIS — D18.0 HEMANGIOMA: ICD-10-CM

## 2024-03-27 DIAGNOSIS — L82.1 OTHER SEBORRHEIC KERATOSIS: ICD-10-CM

## 2024-03-27 DIAGNOSIS — D22 MELANOCYTIC NEVI: ICD-10-CM

## 2024-03-27 PROBLEM — D18.01 HEMANGIOMA OF SKIN AND SUBCUTANEOUS TISSUE: Status: ACTIVE | Noted: 2024-03-27

## 2024-03-27 PROBLEM — D22.5 MELANOCYTIC NEVI OF TRUNK: Status: ACTIVE | Noted: 2024-03-27

## 2024-03-27 PROCEDURE — ? MEDICAL PHOTOGRAPHY REVIEW

## 2024-03-27 PROCEDURE — ? COUNSELING

## 2024-03-27 PROCEDURE — 99213 OFFICE O/P EST LOW 20 MIN: CPT

## 2024-03-27 PROCEDURE — ? TREATMENT REGIMEN

## 2024-03-27 PROCEDURE — ? FULL BODY SKIN EXAM

## 2024-03-27 ASSESSMENT — LOCATION SIMPLE DESCRIPTION DERM
LOCATION SIMPLE: CHEST
LOCATION SIMPLE: LEFT UPPER BACK

## 2024-03-27 ASSESSMENT — LOCATION ZONE DERM: LOCATION ZONE: TRUNK

## 2024-03-27 ASSESSMENT — LOCATION DETAILED DESCRIPTION DERM
LOCATION DETAILED: LEFT MID-UPPER BACK
LOCATION DETAILED: LEFT MEDIAL UPPER BACK
LOCATION DETAILED: LEFT MEDIAL SUPERIOR CHEST

## 2024-04-08 ENCOUNTER — OFFICE VISIT (OUTPATIENT)
Dept: ENDOCRINOLOGY | Age: 49
End: 2024-04-08
Payer: OTHER GOVERNMENT

## 2024-04-08 VITALS
BODY MASS INDEX: 42.78 KG/M2 | OXYGEN SATURATION: 98 % | DIASTOLIC BLOOD PRESSURE: 78 MMHG | WEIGHT: 249.2 LBS | HEART RATE: 83 BPM | SYSTOLIC BLOOD PRESSURE: 124 MMHG

## 2024-04-08 DIAGNOSIS — E66.01 MORBID OBESITY WITH BMI OF 40.0-44.9, ADULT (HCC): ICD-10-CM

## 2024-04-08 DIAGNOSIS — E03.9 PRIMARY HYPOTHYROIDISM: ICD-10-CM

## 2024-04-08 DIAGNOSIS — E06.3 HASHIMOTO'S THYROIDITIS: ICD-10-CM

## 2024-04-08 DIAGNOSIS — E03.9 PRIMARY HYPOTHYROIDISM: Primary | ICD-10-CM

## 2024-04-08 LAB
T4 FREE SERPL-MCNC: 0.9 NG/DL (ref 0.78–1.46)
TSH, 3RD GENERATION: 0.39 UIU/ML (ref 0.36–3.74)

## 2024-04-08 PROCEDURE — 3074F SYST BP LT 130 MM HG: CPT | Performed by: INTERNAL MEDICINE

## 2024-04-08 PROCEDURE — 99213 OFFICE O/P EST LOW 20 MIN: CPT | Performed by: INTERNAL MEDICINE

## 2024-04-08 PROCEDURE — 3078F DIAST BP <80 MM HG: CPT | Performed by: INTERNAL MEDICINE

## 2024-04-08 RX ORDER — LEVOTHYROXINE SODIUM 125 UG/1
125 TABLET ORAL DAILY
Qty: 30 TABLET | Refills: 3
Start: 2024-04-08 | End: 2024-04-09 | Stop reason: SDUPTHER

## 2024-04-08 ASSESSMENT — ENCOUNTER SYMPTOMS
CONSTIPATION: 0
DIARRHEA: 0

## 2024-04-08 NOTE — PROGRESS NOTES
amphetamine-dextroamphetamine (ADDERALL, 20MG,) 20 MG tablet Take 1 tablet by mouth 2 times daily for 30 days. Max Daily Amount: 40 mg 60 tablet 0    lisinopril-hydroCHLOROthiazide (PRINZIDE;ZESTORETIC) 20-25 MG per tablet Take 1 tablet by mouth daily 90 tablet 1    escitalopram (LEXAPRO) 20 MG tablet Take 1 tablet by mouth daily 90 tablet 1    Ascorbic Acid (VITAMIN C PO) Take by mouth      zinc gluconate 50 MG tablet Take 1 tablet by mouth daily      vitamin C (ASCORBIC ACID) 500 MG tablet Take 1 tablet by mouth daily (Patient not taking: Reported on 4/8/2024)       No current facility-administered medications for this visit.         FERNANDA Jeffries MD, FACE      Portions of this note were generated with the assistance of voice recognition software.  As such, some errors in transcription may be present.

## 2024-04-09 DIAGNOSIS — E03.9 PRIMARY HYPOTHYROIDISM: ICD-10-CM

## 2024-04-09 RX ORDER — LEVOTHYROXINE SODIUM 125 UG/1
125 TABLET ORAL DAILY
Qty: 30 TABLET | Refills: 3 | Status: SHIPPED | OUTPATIENT
Start: 2024-04-09

## 2024-04-15 DIAGNOSIS — R40.0 DAYTIME SOMNOLENCE: ICD-10-CM

## 2024-04-16 RX ORDER — DEXTROAMPHETAMINE SACCHARATE, AMPHETAMINE ASPARTATE, DEXTROAMPHETAMINE SULFATE AND AMPHETAMINE SULFATE 5; 5; 5; 5 MG/1; MG/1; MG/1; MG/1
20 TABLET ORAL 2 TIMES DAILY
Qty: 60 TABLET | Refills: 0 | Status: SHIPPED | OUTPATIENT
Start: 2024-04-16 | End: 2024-05-16

## 2024-04-30 DIAGNOSIS — I10 ESSENTIAL HYPERTENSION: ICD-10-CM

## 2024-04-30 DIAGNOSIS — F41.9 ANXIETY: ICD-10-CM

## 2024-05-01 DIAGNOSIS — R40.0 DAYTIME SOMNOLENCE: ICD-10-CM

## 2024-05-03 RX ORDER — LISINOPRIL AND HYDROCHLOROTHIAZIDE 25; 20 MG/1; MG/1
1 TABLET ORAL DAILY
Qty: 90 TABLET | Refills: 0 | Status: SHIPPED | OUTPATIENT
Start: 2024-05-03 | End: 2024-08-01

## 2024-05-03 RX ORDER — ESCITALOPRAM OXALATE 20 MG/1
20 TABLET ORAL DAILY
Qty: 90 TABLET | Refills: 0 | Status: SHIPPED | OUTPATIENT
Start: 2024-05-03 | End: 2024-08-01

## 2024-05-06 DIAGNOSIS — F41.9 ANXIETY: ICD-10-CM

## 2024-05-06 DIAGNOSIS — I10 ESSENTIAL HYPERTENSION: ICD-10-CM

## 2024-05-06 RX ORDER — DEXTROAMPHETAMINE SACCHARATE, AMPHETAMINE ASPARTATE, DEXTROAMPHETAMINE SULFATE AND AMPHETAMINE SULFATE 5; 5; 5; 5 MG/1; MG/1; MG/1; MG/1
20 TABLET ORAL 2 TIMES DAILY
Qty: 60 TABLET | Refills: 0 | Status: SHIPPED | OUTPATIENT
Start: 2024-05-10 | End: 2024-06-09

## 2024-05-07 RX ORDER — LISINOPRIL AND HYDROCHLOROTHIAZIDE 25; 20 MG/1; MG/1
1 TABLET ORAL DAILY
Qty: 90 TABLET | Refills: 0 | Status: SHIPPED | OUTPATIENT
Start: 2024-05-07

## 2024-05-07 RX ORDER — ESCITALOPRAM OXALATE 20 MG/1
20 TABLET ORAL DAILY
Qty: 90 TABLET | Refills: 0 | Status: SHIPPED | OUTPATIENT
Start: 2024-05-07 | End: 2024-08-05

## 2024-06-18 ENCOUNTER — HOSPITAL ENCOUNTER (EMERGENCY)
Age: 49
Discharge: HOME | End: 2024-06-18
Payer: OTHER GOVERNMENT

## 2024-06-18 ENCOUNTER — APPOINTMENT (OUTPATIENT)
Dept: GENERAL RADIOLOGY | Age: 49
End: 2024-06-18
Payer: OTHER GOVERNMENT

## 2024-06-18 VITALS
RESPIRATION RATE: 18 BRPM | HEART RATE: 80 BPM | SYSTOLIC BLOOD PRESSURE: 149 MMHG | OXYGEN SATURATION: 97 % | DIASTOLIC BLOOD PRESSURE: 86 MMHG | WEIGHT: 240 LBS | HEIGHT: 64 IN | BODY MASS INDEX: 40.97 KG/M2 | TEMPERATURE: 98.8 F

## 2024-06-18 DIAGNOSIS — S61.211A LACERATION OF LEFT INDEX FINGER WITHOUT FOREIGN BODY WITHOUT DAMAGE TO NAIL, INITIAL ENCOUNTER: Primary | ICD-10-CM

## 2024-06-18 PROCEDURE — 73140 X-RAY EXAM OF FINGER(S): CPT

## 2024-06-18 PROCEDURE — 99283 EMERGENCY DEPT VISIT LOW MDM: CPT

## 2024-06-18 RX ORDER — ASPIRIN 81 MG/1
81 TABLET, CHEWABLE ORAL DAILY
COMMUNITY

## 2024-06-18 RX ORDER — CEPHALEXIN 500 MG/1
500 CAPSULE ORAL 2 TIMES DAILY
Qty: 10 CAPSULE | Refills: 0 | Status: SHIPPED | OUTPATIENT
Start: 2024-06-18 | End: 2024-06-23

## 2024-06-18 ASSESSMENT — PAIN - FUNCTIONAL ASSESSMENT: PAIN_FUNCTIONAL_ASSESSMENT: 0-10

## 2024-06-18 ASSESSMENT — PAIN SCALES - GENERAL: PAINLEVEL_OUTOF10: 4

## 2024-06-18 NOTE — ED PROVIDER NOTES
signed by ELHAM MOISE                   No results for input(s): \"COVID19\" in the last 72 hours.     Voice dictation software was used during the making of this note.  This software is not perfect and grammatical and other typographical errors may be present.  This note has not been completely proofread for errors.     Anderson Potter PA  06/20/24 1928

## 2024-06-18 NOTE — DISCHARGE INSTRUCTIONS
No broken bones were seen on your x-ray today.  Keep your finger covered with a bandage.  Keep clean with soap and water.  Take course of Keflex as prescribed.  Return if you have any worsening symptoms.

## 2024-06-18 NOTE — ED TRIAGE NOTES
Pt ambulatory to triage for reports of finger laceration. Pt states she was using her circuit when the rotary blade cut her L index finger. Pt states she used quick clot prior to arrival. Laceration covered with pressure gauze at this time. Pt reports she believes she has had tetanus in last 5 years.

## 2024-06-18 NOTE — ED NOTES
Avulsion laceration to the left index finger from a knife earlier this morning.  States that it has continued to bleed

## 2024-07-03 DIAGNOSIS — R40.0 DAYTIME SOMNOLENCE: ICD-10-CM

## 2024-07-03 RX ORDER — DEXTROAMPHETAMINE SACCHARATE, AMPHETAMINE ASPARTATE, DEXTROAMPHETAMINE SULFATE AND AMPHETAMINE SULFATE 5; 5; 5; 5 MG/1; MG/1; MG/1; MG/1
20 TABLET ORAL 2 TIMES DAILY
Qty: 60 TABLET | Refills: 0 | Status: SHIPPED | OUTPATIENT
Start: 2024-07-03 | End: 2024-08-02

## 2024-07-17 ENCOUNTER — TELEPHONE (OUTPATIENT)
Dept: FAMILY MEDICINE CLINIC | Facility: CLINIC | Age: 49
End: 2024-07-17

## 2024-07-17 NOTE — TELEPHONE ENCOUNTER
Left message notifying patient we are canceling her appointment since she didn't get lab work done. Told patient to call us back to reschedule.

## 2024-07-29 DIAGNOSIS — I10 ESSENTIAL HYPERTENSION: ICD-10-CM

## 2024-07-30 RX ORDER — LISINOPRIL AND HYDROCHLOROTHIAZIDE 25; 20 MG/1; MG/1
1 TABLET ORAL DAILY
Qty: 90 TABLET | Refills: 1 | OUTPATIENT
Start: 2024-07-30

## 2024-07-30 NOTE — TELEPHONE ENCOUNTER
Spoke with Mrs Snell  she needs a appointment for BP check in order to refill Rx , I schedule an appointment for 07/31 at 9:10 am

## 2024-07-31 ENCOUNTER — OFFICE VISIT (OUTPATIENT)
Dept: FAMILY MEDICINE CLINIC | Facility: CLINIC | Age: 49
End: 2024-07-31
Payer: OTHER GOVERNMENT

## 2024-07-31 VITALS
WEIGHT: 256 LBS | BODY MASS INDEX: 43.71 KG/M2 | DIASTOLIC BLOOD PRESSURE: 82 MMHG | SYSTOLIC BLOOD PRESSURE: 122 MMHG | HEIGHT: 64 IN | HEART RATE: 86 BPM

## 2024-07-31 DIAGNOSIS — I10 ESSENTIAL HYPERTENSION: ICD-10-CM

## 2024-07-31 DIAGNOSIS — E66.01 CLASS 3 SEVERE OBESITY DUE TO EXCESS CALORIES WITH SERIOUS COMORBIDITY AND BODY MASS INDEX (BMI) OF 40.0 TO 44.9 IN ADULT (HCC): Primary | ICD-10-CM

## 2024-07-31 DIAGNOSIS — F41.9 ANXIETY: ICD-10-CM

## 2024-07-31 LAB
ALBUMIN SERPL-MCNC: 3.8 G/DL (ref 3.5–5)
ALBUMIN/GLOB SERPL: 1.1 (ref 1–1.9)
ALP SERPL-CCNC: 194 U/L (ref 35–104)
ALT SERPL-CCNC: 31 U/L (ref 12–65)
ANION GAP SERPL CALC-SCNC: 9 MMOL/L (ref 9–18)
AST SERPL-CCNC: 23 U/L (ref 15–37)
BILIRUB SERPL-MCNC: 0.3 MG/DL (ref 0–1.2)
BILIRUBIN, URINE, POC: NEGATIVE
BLOOD URINE, POC: NEGATIVE
BUN SERPL-MCNC: 13 MG/DL (ref 6–23)
CALCIUM SERPL-MCNC: 9.5 MG/DL (ref 8.8–10.2)
CHLORIDE SERPL-SCNC: 101 MMOL/L (ref 98–107)
CO2 SERPL-SCNC: 30 MMOL/L (ref 20–28)
CREAT SERPL-MCNC: 0.69 MG/DL (ref 0.6–1.1)
GLOBULIN SER CALC-MCNC: 3.5 G/DL (ref 2.3–3.5)
GLUCOSE SERPL-MCNC: 83 MG/DL (ref 70–99)
GLUCOSE URINE, POC: NEGATIVE
GRANS ABSOLUTE, POC: 3.7 K/UL
GRANULOCYTES %, POC: 50.9 %
HEMATOCRIT, POC: 42.3 %
HEMOGLOBIN, POC: 13.5 G/DL
KETONES, URINE, POC: NEGATIVE
LEUKOCYTE ESTERASE, URINE, POC: NEGATIVE
LYMPHOCYTE %, POC: 39.1 %
LYMPHS ABSOLUTE, POC: 2.8 K/UL
MCH, POC: NORMAL PG (ref 40–?)
MCHC, POC: 31.9
MCV, POC: 95.3
MONOCYTE %, POC: 10 %
MONOCYTE, ABSOLUTE POC: 0.7 K/UL
MPV, POC: 7.6 FL
NITRITE, URINE, POC: NEGATIVE
PH, URINE, POC: 7 (ref 4.6–8)
PLATELET COUNT, POC: 347 K/UL
POTASSIUM SERPL-SCNC: 3.6 MMOL/L (ref 3.5–5.1)
PROT SERPL-MCNC: 7.3 G/DL (ref 6.3–8.2)
PROTEIN,URINE, POC: NEGATIVE
RBC, POC: 4.44 M/UL
RDW, POC: 12.9 %
SODIUM SERPL-SCNC: 140 MMOL/L (ref 136–145)
SPECIFIC GRAVITY, URINE, POC: 1.01 (ref 1–1.03)
URINALYSIS CLARITY, POC: CLEAR
URINALYSIS COLOR, POC: YELLOW
UROBILINOGEN, POC: NORMAL
WBC, POC: 7.2 K/UL

## 2024-07-31 PROCEDURE — 3074F SYST BP LT 130 MM HG: CPT | Performed by: FAMILY MEDICINE

## 2024-07-31 PROCEDURE — 81003 URINALYSIS AUTO W/O SCOPE: CPT | Performed by: FAMILY MEDICINE

## 2024-07-31 PROCEDURE — 85025 COMPLETE CBC W/AUTO DIFF WBC: CPT | Performed by: FAMILY MEDICINE

## 2024-07-31 PROCEDURE — 99214 OFFICE O/P EST MOD 30 MIN: CPT | Performed by: FAMILY MEDICINE

## 2024-07-31 PROCEDURE — 3079F DIAST BP 80-89 MM HG: CPT | Performed by: FAMILY MEDICINE

## 2024-07-31 RX ORDER — SEMAGLUTIDE 0.25 MG/.5ML
0.25 INJECTION, SOLUTION SUBCUTANEOUS
Qty: 2 ML | Refills: 5 | Status: SHIPPED | OUTPATIENT
Start: 2024-07-31

## 2024-07-31 RX ORDER — ESCITALOPRAM OXALATE 20 MG/1
20 TABLET ORAL DAILY
Qty: 90 TABLET | Refills: 3 | Status: SHIPPED | OUTPATIENT
Start: 2024-07-31 | End: 2025-07-26

## 2024-07-31 RX ORDER — LISINOPRIL AND HYDROCHLOROTHIAZIDE 25; 20 MG/1; MG/1
1 TABLET ORAL DAILY
Qty: 90 TABLET | Refills: 3 | Status: SHIPPED | OUTPATIENT
Start: 2024-07-31

## 2024-07-31 RX ORDER — SEMAGLUTIDE 0.25 MG/.5ML
0.25 INJECTION, SOLUTION SUBCUTANEOUS
COMMUNITY
End: 2024-07-31 | Stop reason: SDUPTHER

## 2024-07-31 SDOH — ECONOMIC STABILITY: HOUSING INSECURITY
IN THE LAST 12 MONTHS, WAS THERE A TIME WHEN YOU DID NOT HAVE A STEADY PLACE TO SLEEP OR SLEPT IN A SHELTER (INCLUDING NOW)?: NO

## 2024-07-31 SDOH — ECONOMIC STABILITY: FOOD INSECURITY: WITHIN THE PAST 12 MONTHS, YOU WORRIED THAT YOUR FOOD WOULD RUN OUT BEFORE YOU GOT MONEY TO BUY MORE.: NEVER TRUE

## 2024-07-31 SDOH — ECONOMIC STABILITY: FOOD INSECURITY: WITHIN THE PAST 12 MONTHS, THE FOOD YOU BOUGHT JUST DIDN'T LAST AND YOU DIDN'T HAVE MONEY TO GET MORE.: NEVER TRUE

## 2024-07-31 SDOH — ECONOMIC STABILITY: INCOME INSECURITY: HOW HARD IS IT FOR YOU TO PAY FOR THE VERY BASICS LIKE FOOD, HOUSING, MEDICAL CARE, AND HEATING?: NOT HARD AT ALL

## 2024-07-31 SDOH — ECONOMIC STABILITY: TRANSPORTATION INSECURITY
IN THE PAST 12 MONTHS, HAS LACK OF TRANSPORTATION KEPT YOU FROM MEETINGS, WORK, OR FROM GETTING THINGS NEEDED FOR DAILY LIVING?: NO

## 2024-07-31 ASSESSMENT — ENCOUNTER SYMPTOMS
DIARRHEA: 0
COUGH: 0
SINUS PAIN: 0
RHINORRHEA: 0
SHORTNESS OF BREATH: 0
ABDOMINAL PAIN: 0

## 2024-07-31 NOTE — PROGRESS NOTES
PROGRESS NOTE    SUBJECTIVE:   Karina Snell is a 49 y.o. female seen for a follow up visit regarding the following chief complaint:     Chief Complaint   Patient presents with    Follow-up     BP check           HPI patient presents the office for blood pressure check and wants to start a medication for weight loss/Wegovy (covered by her insurance).      Past Medical History, Past Surgical History, Family history, Social History, and Medications were all reviewed with the patient today and updated as necessary.       Current Outpatient Medications   Medication Sig Dispense Refill    escitalopram (LEXAPRO) 20 MG tablet Take 1 tablet by mouth daily 90 tablet 3    lisinopril-hydroCHLOROthiazide (PRINZIDE;ZESTORETIC) 20-25 MG per tablet Take 1 tablet by mouth daily 90 tablet 3    Semaglutide-Weight Management (WEGOVY) 0.25 MG/0.5ML SOAJ SC injection Inject 0.25 mg into the skin every 7 days 2 mL 5    aspirin 81 MG chewable tablet Take 1 tablet by mouth daily      LEVOXYL 125 MCG tablet Take 1 tablet by mouth Daily 30 tablet 3    Ascorbic Acid (VITAMIN C PO) Take by mouth      zinc gluconate 50 MG tablet Take 1 tablet by mouth daily      vitamin C (ASCORBIC ACID) 500 MG tablet Take 1 tablet by mouth daily       No current facility-administered medications for this visit.     Allergies   Allergen Reactions    Bupropion Shortness Of Breath and Anxiety    Ondansetron Itching     Patient Active Problem List   Diagnosis    Essential hypertension    AR (allergic rhinitis)    Morbid obesity with BMI of 40.0-44.9, adult (HCC)    Daytime sleepiness    KISHA (obstructive sleep apnea)    Primary hypothyroidism    Obstructive sleep apnea on CPAP    Hashimoto's thyroiditis    DUB (dysfunctional uterine bleeding)     Past Medical History:   Diagnosis Date    Acute sinusitis     Allergic rhinitis     Dermatitis     Eczema     Generalized anxiety disorder     Hashimoto's thyroiditis     Hypertension     IBS (irritable bowel syndrome)

## 2024-08-28 DIAGNOSIS — R40.0 DAYTIME SOMNOLENCE: ICD-10-CM

## 2024-08-28 DIAGNOSIS — E03.9 PRIMARY HYPOTHYROIDISM: ICD-10-CM

## 2024-08-28 RX ORDER — DEXTROAMPHETAMINE SACCHARATE, AMPHETAMINE ASPARTATE, DEXTROAMPHETAMINE SULFATE AND AMPHETAMINE SULFATE 5; 5; 5; 5 MG/1; MG/1; MG/1; MG/1
20 TABLET ORAL 2 TIMES DAILY
Qty: 60 TABLET | Refills: 0 | Status: SHIPPED | OUTPATIENT
Start: 2024-08-28 | End: 2024-09-27

## 2024-08-29 RX ORDER — LEVOTHYROXINE SODIUM 125 UG/1
125 TABLET ORAL DAILY
Qty: 30 TABLET | Refills: 3 | OUTPATIENT
Start: 2024-08-29

## 2024-08-31 DIAGNOSIS — E66.01 CLASS 3 SEVERE OBESITY DUE TO EXCESS CALORIES WITH SERIOUS COMORBIDITY AND BODY MASS INDEX (BMI) OF 40.0 TO 44.9 IN ADULT (HCC): ICD-10-CM

## 2024-09-04 RX ORDER — SEMAGLUTIDE 0.25 MG/.5ML
0.25 INJECTION, SOLUTION SUBCUTANEOUS
Qty: 2.5 ML | Refills: 2 | Status: SHIPPED | OUTPATIENT
Start: 2024-09-04 | End: 2024-12-18

## 2024-09-09 ENCOUNTER — OFFICE VISIT (OUTPATIENT)
Dept: ENDOCRINOLOGY | Age: 49
End: 2024-09-09
Payer: OTHER GOVERNMENT

## 2024-09-09 ENCOUNTER — PATIENT MESSAGE (OUTPATIENT)
Dept: ENDOCRINOLOGY | Age: 49
End: 2024-09-09

## 2024-09-09 VITALS
SYSTOLIC BLOOD PRESSURE: 138 MMHG | WEIGHT: 262 LBS | HEART RATE: 84 BPM | BODY MASS INDEX: 44.97 KG/M2 | DIASTOLIC BLOOD PRESSURE: 90 MMHG

## 2024-09-09 DIAGNOSIS — E03.9 PRIMARY HYPOTHYROIDISM: Primary | ICD-10-CM

## 2024-09-09 DIAGNOSIS — E06.3 HASHIMOTO'S THYROIDITIS: ICD-10-CM

## 2024-09-09 DIAGNOSIS — E03.9 PRIMARY HYPOTHYROIDISM: ICD-10-CM

## 2024-09-09 DIAGNOSIS — E66.01 MORBID OBESITY WITH BODY MASS INDEX (BMI) OF 40.0 OR HIGHER (HCC): ICD-10-CM

## 2024-09-09 LAB
T4 FREE SERPL-MCNC: 0.7 NG/DL (ref 0.9–1.7)
TSH, 3RD GENERATION: 2.61 UIU/ML (ref 0.27–4.2)

## 2024-09-09 PROCEDURE — 99214 OFFICE O/P EST MOD 30 MIN: CPT | Performed by: INTERNAL MEDICINE

## 2024-09-09 PROCEDURE — 3080F DIAST BP >= 90 MM HG: CPT | Performed by: INTERNAL MEDICINE

## 2024-09-09 PROCEDURE — 3075F SYST BP GE 130 - 139MM HG: CPT | Performed by: INTERNAL MEDICINE

## 2024-09-09 RX ORDER — LEVOTHYROXINE SODIUM 125 UG/1
125 TABLET ORAL DAILY
Qty: 90 TABLET | Refills: 3
Start: 2024-09-09 | End: 2024-09-09 | Stop reason: SDUPTHER

## 2024-09-09 RX ORDER — LEVOTHYROXINE SODIUM 125 UG/1
125 TABLET ORAL DAILY
Qty: 90 TABLET | Refills: 3 | Status: SHIPPED | OUTPATIENT
Start: 2024-09-09

## 2024-09-09 ASSESSMENT — ENCOUNTER SYMPTOMS
DIARRHEA: 0
CONSTIPATION: 0

## 2024-09-22 ENCOUNTER — NURSE TRIAGE (OUTPATIENT)
Dept: OTHER | Facility: CLINIC | Age: 49
End: 2024-09-22

## 2024-10-21 ENCOUNTER — OFFICE VISIT (OUTPATIENT)
Dept: FAMILY MEDICINE CLINIC | Facility: CLINIC | Age: 49
End: 2024-10-21
Payer: OTHER GOVERNMENT

## 2024-10-21 VITALS
OXYGEN SATURATION: 98 % | HEART RATE: 72 BPM | HEIGHT: 64 IN | SYSTOLIC BLOOD PRESSURE: 140 MMHG | TEMPERATURE: 98.7 F | RESPIRATION RATE: 18 BRPM | BODY MASS INDEX: 44.25 KG/M2 | WEIGHT: 259.2 LBS | DIASTOLIC BLOOD PRESSURE: 100 MMHG

## 2024-10-21 DIAGNOSIS — E66.813 CLASS 3 SEVERE OBESITY DUE TO EXCESS CALORIES WITH SERIOUS COMORBIDITY AND BODY MASS INDEX (BMI) OF 40.0 TO 44.9 IN ADULT: ICD-10-CM

## 2024-10-21 DIAGNOSIS — E03.9 ACQUIRED HYPOTHYROIDISM: Primary | ICD-10-CM

## 2024-10-21 DIAGNOSIS — E66.01 CLASS 3 SEVERE OBESITY DUE TO EXCESS CALORIES WITH SERIOUS COMORBIDITY AND BODY MASS INDEX (BMI) OF 40.0 TO 44.9 IN ADULT: ICD-10-CM

## 2024-10-21 DIAGNOSIS — I10 ESSENTIAL HYPERTENSION: ICD-10-CM

## 2024-10-21 PROCEDURE — 3075F SYST BP GE 130 - 139MM HG: CPT | Performed by: FAMILY MEDICINE

## 2024-10-21 PROCEDURE — 3080F DIAST BP >= 90 MM HG: CPT | Performed by: FAMILY MEDICINE

## 2024-10-21 PROCEDURE — 99214 OFFICE O/P EST MOD 30 MIN: CPT | Performed by: FAMILY MEDICINE

## 2024-10-21 RX ORDER — LEVOTHYROXINE SODIUM 137 UG/1
137 TABLET ORAL DAILY
Qty: 90 TABLET | Refills: 1 | Status: SHIPPED | OUTPATIENT
Start: 2024-10-21

## 2024-10-21 RX ORDER — SEMAGLUTIDE 0.25 MG/.5ML
0.5 INJECTION, SOLUTION SUBCUTANEOUS
Qty: 2.5 ML | Refills: 5 | Status: SHIPPED | OUTPATIENT
Start: 2024-10-21 | End: 2025-02-03

## 2024-10-21 ASSESSMENT — PATIENT HEALTH QUESTIONNAIRE - PHQ9
SUM OF ALL RESPONSES TO PHQ QUESTIONS 1-9: 0
SUM OF ALL RESPONSES TO PHQ QUESTIONS 1-9: 0
2. FEELING DOWN, DEPRESSED OR HOPELESS: NOT AT ALL
SUM OF ALL RESPONSES TO PHQ9 QUESTIONS 1 & 2: 0
SUM OF ALL RESPONSES TO PHQ QUESTIONS 1-9: 0
SUM OF ALL RESPONSES TO PHQ QUESTIONS 1-9: 0
1. LITTLE INTEREST OR PLEASURE IN DOING THINGS: NOT AT ALL

## 2024-10-21 ASSESSMENT — ENCOUNTER SYMPTOMS
DIARRHEA: 0
COUGH: 0
SINUS PAIN: 0
RHINORRHEA: 0
SHORTNESS OF BREATH: 0
ABDOMINAL PAIN: 0

## 2024-10-21 NOTE — PROGRESS NOTES
40.0-44.9, adult    Daytime sleepiness    KISHA (obstructive sleep apnea)    Primary hypothyroidism    Obstructive sleep apnea on CPAP    Hashimoto's thyroiditis    DUB (dysfunctional uterine bleeding)     Past Medical History:   Diagnosis Date    Acute sinusitis     Allergic rhinitis     Dermatitis     Eczema     Generalized anxiety disorder     Hashimoto's thyroiditis     Hypertension     IBS (irritable bowel syndrome)     Obstructive sleep apnea on CPAP     Primary hypothyroidism     Urticaria      Past Surgical History:   Procedure Laterality Date    ABDOMINOPLASTY  2005    BREAST ENHANCEMENT SURGERY  2006     Family History   Problem Relation Age of Onset    Breast Cancer Mother 62        BRCA Neg    Hypothyroidism Mother     No Known Problems Father     Hypothyroidism Maternal Grandmother     Hypothyroidism Maternal Aunt     Thyroid Cancer Neg Hx      Social History     Tobacco Use    Smoking status: Former     Current packs/day: 0.00     Average packs/day: 0.5 packs/day for 5.0 years (2.5 ttl pk-yrs)     Types: Cigarettes     Start date: 2002     Quit date: 2007     Years since quittin.9     Passive exposure: Past    Smokeless tobacco: Never   Substance Use Topics    Alcohol use: No         Review of Systems   Constitutional:  Negative for chills, fatigue and fever.   HENT:  Negative for congestion, rhinorrhea and sinus pain.    Eyes:  Negative for visual disturbance.   Respiratory:  Negative for cough and shortness of breath.    Cardiovascular:  Negative for chest pain.   Gastrointestinal:  Negative for abdominal pain and diarrhea.   Genitourinary:  Negative for dysuria.   Musculoskeletal:  Negative for arthralgias and myalgias.   Neurological:  Negative for dizziness, weakness and headaches.   Psychiatric/Behavioral: Negative.           OBJECTIVE:  BP (!) 140/100 (Site: Left Upper Arm, Position: Sitting, Cuff Size: Large Adult)   Pulse 72   Temp 98.7 °F (37.1 °C) (Oral)   Resp 18   Ht 1.626

## 2024-10-24 RX ORDER — SEMAGLUTIDE 0.5 MG/.5ML
0.5 INJECTION, SOLUTION SUBCUTANEOUS
Qty: 2 ML | Refills: 5 | Status: SHIPPED | OUTPATIENT
Start: 2024-10-24 | End: 2025-04-10

## 2024-10-27 DIAGNOSIS — I10 ESSENTIAL HYPERTENSION: ICD-10-CM

## 2024-10-28 RX ORDER — LISINOPRIL AND HYDROCHLOROTHIAZIDE 20; 25 MG/1; MG/1
1 TABLET ORAL DAILY
Qty: 90 TABLET | Refills: 0 | OUTPATIENT
Start: 2024-10-28

## 2024-11-14 DIAGNOSIS — I10 ESSENTIAL HYPERTENSION: ICD-10-CM

## 2024-11-15 RX ORDER — LISINOPRIL AND HYDROCHLOROTHIAZIDE 20; 25 MG/1; MG/1
1 TABLET ORAL DAILY
Qty: 90 TABLET | Refills: 3 | OUTPATIENT
Start: 2024-11-15

## 2024-12-05 ENCOUNTER — LAB (OUTPATIENT)
Dept: FAMILY MEDICINE CLINIC | Facility: CLINIC | Age: 49
End: 2024-12-05
Payer: OTHER GOVERNMENT

## 2024-12-05 DIAGNOSIS — E03.9 ACQUIRED HYPOTHYROIDISM: ICD-10-CM

## 2024-12-05 LAB — TSH W FREE THYROID IF ABNORMAL: 0.66 UIU/ML (ref 0.27–4.2)

## 2024-12-05 PROCEDURE — 36415 COLL VENOUS BLD VENIPUNCTURE: CPT | Performed by: FAMILY MEDICINE

## 2025-01-06 RX ORDER — SEMAGLUTIDE 2.4 MG/.75ML
2.4 INJECTION, SOLUTION SUBCUTANEOUS
Qty: 3 ML | Refills: 5 | Status: SHIPPED | OUTPATIENT
Start: 2025-01-06 | End: 2025-06-23

## 2025-01-24 DIAGNOSIS — E55.9 VITAMIN D DEFICIENCY: ICD-10-CM

## 2025-01-24 DIAGNOSIS — Z00.00 LABORATORY EXAMINATION ORDERED AS PART OF A ROUTINE GENERAL MEDICAL EXAMINATION: Primary | ICD-10-CM

## 2025-01-24 DIAGNOSIS — E78.01 FAMILIAL HYPERCHOLESTEROLEMIA: ICD-10-CM

## 2025-01-30 ENCOUNTER — LAB (OUTPATIENT)
Dept: FAMILY MEDICINE CLINIC | Facility: CLINIC | Age: 50
End: 2025-01-30
Payer: OTHER GOVERNMENT

## 2025-01-30 DIAGNOSIS — Z00.00 LABORATORY EXAMINATION ORDERED AS PART OF A ROUTINE GENERAL MEDICAL EXAMINATION: ICD-10-CM

## 2025-01-30 DIAGNOSIS — E55.9 VITAMIN D DEFICIENCY: ICD-10-CM

## 2025-01-30 DIAGNOSIS — E78.01 FAMILIAL HYPERCHOLESTEROLEMIA: ICD-10-CM

## 2025-01-30 LAB
25(OH)D3 SERPL-MCNC: 138 NG/ML (ref 30–100)
ALBUMIN SERPL-MCNC: 4.1 G/DL (ref 3.5–5)
ALBUMIN/GLOB SERPL: 1.2 (ref 1–1.9)
ALP SERPL-CCNC: 160 U/L (ref 35–104)
ALT SERPL-CCNC: 47 U/L (ref 8–45)
ANION GAP SERPL CALC-SCNC: 11 MMOL/L (ref 7–16)
AST SERPL-CCNC: 30 U/L (ref 15–37)
BILIRUB SERPL-MCNC: 0.4 MG/DL (ref 0–1.2)
BILIRUBIN, URINE, POC: NEGATIVE
BLOOD URINE, POC: NEGATIVE
BUN SERPL-MCNC: 16 MG/DL (ref 6–23)
CALCIUM SERPL-MCNC: 9.9 MG/DL (ref 8.8–10.2)
CHLORIDE SERPL-SCNC: 100 MMOL/L (ref 98–107)
CHOLEST SERPL-MCNC: 182 MG/DL (ref 0–200)
CO2 SERPL-SCNC: 29 MMOL/L (ref 20–29)
CREAT SERPL-MCNC: 0.74 MG/DL (ref 0.6–1.1)
GLOBULIN SER CALC-MCNC: 3.3 G/DL (ref 2.3–3.5)
GLUCOSE SERPL-MCNC: 120 MG/DL (ref 70–99)
GLUCOSE URINE, POC: NEGATIVE
GRANS ABSOLUTE, POC: 3.2 K/UL
GRANULOCYTES %, POC: 54.7 %
HDLC SERPL-MCNC: 50 MG/DL (ref 40–60)
HDLC SERPL: 3.7 (ref 0–5)
HEMATOCRIT, POC: 44.9 %
HEMOGLOBIN, POC: 14.6 G/DL
KETONES, URINE, POC: NEGATIVE
LDLC SERPL CALC-MCNC: 114 MG/DL (ref 0–100)
LEUKOCYTE ESTERASE, URINE, POC: NEGATIVE
LYMPHOCYTE %, POC: 36.2 %
LYMPHS ABSOLUTE, POC: 2.1 K/UL
MCH, POC: NORMAL PG (ref 40–?)
MCHC, POC: 32.5
MCV, POC: 90.3
MONOCYTE %, POC: 9.1 %
MONOCYTE, ABSOLUTE POC: 0.5 K/UL
MPV, POC: 8.3 FL
NITRITE, URINE, POC: NEGATIVE
PH, URINE, POC: 7 (ref 4.6–8)
PLATELET COUNT, POC: 297 K/UL
POTASSIUM SERPL-SCNC: 3.6 MMOL/L (ref 3.5–5.1)
PROT SERPL-MCNC: 7.4 G/DL (ref 6.3–8.2)
PROTEIN,URINE, POC: NEGATIVE
RBC, POC: 4.97 M/UL
RDW, POC: 13.1 %
SODIUM SERPL-SCNC: 140 MMOL/L (ref 136–145)
SPECIFIC GRAVITY, URINE, POC: 1.02 (ref 1–1.03)
TRIGL SERPL-MCNC: 92 MG/DL (ref 0–150)
TSH W FREE THYROID IF ABNORMAL: 0.3 UIU/ML (ref 0.27–4.2)
URINALYSIS CLARITY, POC: CLEAR
URINALYSIS COLOR, POC: YELLOW
UROBILINOGEN, POC: NORMAL
VLDLC SERPL CALC-MCNC: 18 MG/DL (ref 6–23)
WBC, POC: 5.9 K/UL

## 2025-01-30 PROCEDURE — 81003 URINALYSIS AUTO W/O SCOPE: CPT | Performed by: FAMILY MEDICINE

## 2025-01-30 PROCEDURE — 85025 COMPLETE CBC W/AUTO DIFF WBC: CPT | Performed by: FAMILY MEDICINE

## 2025-02-01 ASSESSMENT — PATIENT HEALTH QUESTIONNAIRE - PHQ9
SUM OF ALL RESPONSES TO PHQ QUESTIONS 1-9: 0
SUM OF ALL RESPONSES TO PHQ QUESTIONS 1-9: 0
SUM OF ALL RESPONSES TO PHQ9 QUESTIONS 1 & 2: 0
1. LITTLE INTEREST OR PLEASURE IN DOING THINGS: NOT AT ALL
SUM OF ALL RESPONSES TO PHQ9 QUESTIONS 1 & 2: 0
1. LITTLE INTEREST OR PLEASURE IN DOING THINGS: NOT AT ALL
2. FEELING DOWN, DEPRESSED OR HOPELESS: NOT AT ALL
SUM OF ALL RESPONSES TO PHQ QUESTIONS 1-9: 0
SUM OF ALL RESPONSES TO PHQ QUESTIONS 1-9: 0
2. FEELING DOWN, DEPRESSED OR HOPELESS: NOT AT ALL

## 2025-02-04 ENCOUNTER — OFFICE VISIT (OUTPATIENT)
Dept: FAMILY MEDICINE CLINIC | Facility: CLINIC | Age: 50
End: 2025-02-04
Payer: OTHER GOVERNMENT

## 2025-02-04 VITALS
HEART RATE: 68 BPM | DIASTOLIC BLOOD PRESSURE: 70 MMHG | SYSTOLIC BLOOD PRESSURE: 116 MMHG | WEIGHT: 258 LBS | BODY MASS INDEX: 44.05 KG/M2 | HEIGHT: 64 IN

## 2025-02-04 DIAGNOSIS — Z00.00 ROUTINE GENERAL MEDICAL EXAMINATION AT A HEALTH CARE FACILITY: Primary | ICD-10-CM

## 2025-02-04 DIAGNOSIS — I10 ESSENTIAL HYPERTENSION: ICD-10-CM

## 2025-02-04 DIAGNOSIS — Z13.31 SCREENING FOR DEPRESSION: ICD-10-CM

## 2025-02-04 DIAGNOSIS — Z12.31 OTHER SCREENING MAMMOGRAM: ICD-10-CM

## 2025-02-04 DIAGNOSIS — E03.9 PRIMARY HYPOTHYROIDISM: ICD-10-CM

## 2025-02-04 DIAGNOSIS — F41.9 ANXIETY: ICD-10-CM

## 2025-02-04 DIAGNOSIS — E03.9 ACQUIRED HYPOTHYROIDISM: ICD-10-CM

## 2025-02-04 DIAGNOSIS — Z12.11 SPECIAL SCREENING FOR MALIGNANT NEOPLASMS, COLON: ICD-10-CM

## 2025-02-04 DIAGNOSIS — E66.813 CLASS 3 SEVERE OBESITY DUE TO EXCESS CALORIES WITH SERIOUS COMORBIDITY AND BODY MASS INDEX (BMI) OF 40.0 TO 44.9 IN ADULT: ICD-10-CM

## 2025-02-04 DIAGNOSIS — E66.01 CLASS 3 SEVERE OBESITY DUE TO EXCESS CALORIES WITH SERIOUS COMORBIDITY AND BODY MASS INDEX (BMI) OF 40.0 TO 44.9 IN ADULT: ICD-10-CM

## 2025-02-04 PROCEDURE — 3078F DIAST BP <80 MM HG: CPT | Performed by: FAMILY MEDICINE

## 2025-02-04 PROCEDURE — 99396 PREV VISIT EST AGE 40-64: CPT | Performed by: FAMILY MEDICINE

## 2025-02-04 PROCEDURE — 3074F SYST BP LT 130 MM HG: CPT | Performed by: FAMILY MEDICINE

## 2025-02-04 RX ORDER — LEVOTHYROXINE SODIUM 137 UG/1
137 TABLET ORAL DAILY
Qty: 90 TABLET | Refills: 3 | Status: SHIPPED | OUTPATIENT
Start: 2025-02-04

## 2025-02-04 RX ORDER — ESCITALOPRAM OXALATE 20 MG/1
20 TABLET ORAL DAILY
Qty: 90 TABLET | Refills: 3 | Status: SHIPPED | OUTPATIENT
Start: 2025-02-04 | End: 2026-01-30

## 2025-02-04 RX ORDER — SEMAGLUTIDE 2.4 MG/.75ML
2.4 INJECTION, SOLUTION SUBCUTANEOUS
Qty: 3 ML | Refills: 5 | Status: SHIPPED | OUTPATIENT
Start: 2025-02-04 | End: 2025-07-22

## 2025-02-04 RX ORDER — LISINOPRIL AND HYDROCHLOROTHIAZIDE 20; 25 MG/1; MG/1
1 TABLET ORAL DAILY
Qty: 90 TABLET | Refills: 3 | Status: SHIPPED | OUTPATIENT
Start: 2025-02-04

## 2025-02-04 RX ORDER — LEVOTHYROXINE SODIUM 125 UG/1
125 TABLET ORAL DAILY
Qty: 90 TABLET | Refills: 3 | Status: SHIPPED | OUTPATIENT
Start: 2025-02-04

## 2025-02-04 SDOH — ECONOMIC STABILITY: FOOD INSECURITY: WITHIN THE PAST 12 MONTHS, YOU WORRIED THAT YOUR FOOD WOULD RUN OUT BEFORE YOU GOT MONEY TO BUY MORE.: NEVER TRUE

## 2025-02-04 SDOH — ECONOMIC STABILITY: FOOD INSECURITY: WITHIN THE PAST 12 MONTHS, THE FOOD YOU BOUGHT JUST DIDN'T LAST AND YOU DIDN'T HAVE MONEY TO GET MORE.: NEVER TRUE

## 2025-02-04 ASSESSMENT — ENCOUNTER SYMPTOMS
SHORTNESS OF BREATH: 0
ABDOMINAL PAIN: 0
COUGH: 0

## 2025-02-04 NOTE — PROGRESS NOTES
4.1 3.5 - 5.0 g/dL    Globulin 3.3 2.3 - 3.5 g/dL    Albumin/Globulin Ratio 1.2 1.0 - 1.9     Lipid Panel    Collection Time: 01/30/25  8:12 AM   Result Value Ref Range    Cholesterol, Total 182 0 - 200 MG/DL    Triglycerides 92 0 - 150 MG/DL    HDL 50 40 - 60 MG/DL    LDL Cholesterol 114 (H) 0 - 100 MG/DL    VLDL Cholesterol Calculated 18 6 - 23 MG/DL    Chol/HDL Ratio 3.7 0.0 - 5.0     Vitamin D 25 Hydroxy    Collection Time: 01/30/25  8:12 AM   Result Value Ref Range    Vit D, 25-Hydroxy 138.0 (H) 30.0 - 100.0 ng/mL   TSH reflex to FT4    Collection Time: 01/30/25  8:12 AM   Result Value Ref Range    TSH w Free Thyroid if Abnormal 0.30 0.27 - 4.20 UIU/ML   AMB POC URINALYSIS DIP STICK AUTO W/O MICRO    Collection Time: 01/30/25  8:36 AM   Result Value Ref Range    Color, Urine, POC Yellow     Clarity, Urine, POC Clear     Glucose, Urine, POC Negative     Bilirubin, Urine, POC Negative     Ketones, Urine, POC Negative     Specific Gravity, Urine, POC 1.020 1.001 - 1.035    Blood, Urine, POC Negative Negative    pH, Urine, POC 7.0 4.6 - 8.0    Protein, Urine, POC Negative     Urobilinogen, POC Normal     Nitrite, Urine, POC Negative     Leukocyte Esterase, Urine, POC Negative    AMB POC KTY COMPLETE CBC    Collection Time: 01/30/25  8:42 AM   Result Value Ref Range    WBC, POC 5.9 K/uL    Lymphocyte % 36.2 %    Monocyte % 9.1 %    Granulocytes %, POC 54.7 %    Lymphs Abs 2.1 K/uL    Monocyte Absolute, POC 0.5 K/uL    Granulocytes Abs 3.2 K/uL    RBC, POC 4.97 M/uL    Hemoglobin, POC 14.6 G/DL    Hematocrit, POC 44.9 %    MCV 90.3     MCH 29.4* 40 pg    MCHC 32.5     RDW, POC 13.1 %    Platelet Count,  K/UL    MPV POC 8.3 fL       ASSESSMENT and PLAN    Visit Diagnoses and Associated Orders       Routine general medical examination at a health care facility    -  Primary         Anxiety        escitalopram (LEXAPRO) 20 MG tablet [[id number]]           Essential hypertension        lisinopril-hydroCHLOROthiazide

## 2025-04-02 NOTE — PROCEDURE: SHAVE REMOVAL
Problem: Wound:  Goal: Will show signs of wound healing; wound closure and no evidence of infection  Description: Will show signs of wound healing; wound closure and no evidence of infection  Outcome: Progressing     Problem: Falls - Risk of:  Goal: Will remain free from falls  Description: Will remain free from falls  Outcome: Progressing     
Render Path Notes In Note?: No
Billing Type: Third-Party Bill
Notification Instructions: Patient will be notified of pathology results. However, patient instructed to call the office if not contacted within 2 weeks.
Size Of Lesion In Cm (Required): 0.7
Medical Necessity Clause: This procedure was medically necessary because the lesion that was treated was:
Bill For Surgical Tray: yes
Detail Level: Detailed
Hemostasis: Drysol
Biopsy Method: Dermablade
Post-Care Instructions: I reviewed with the patient in detail post-care instructions. Patient is to keep the biopsy site dry overnight, and then apply bacitracin twice daily until healed. Patient may apply hydrogen peroxide soaks to remove any crusting.
Wound Care: Petrolatum
Accession #: [id number]
Anesthesia Type: 1% lidocaine with epinephrine
Consent was obtained from the patient. The risks and benefits to therapy were discussed in detail. Specifically, the risks of infection, scarring, bleeding, prolonged wound healing, incomplete removal, allergy to anesthesia, nerve injury and recurrence were addressed. Prior to the procedure, the treatment site was clearly identified and confirmed by the patient. All components of Universal Protocol/PAUSE Rule completed.
X Size Of Lesion In Cm (Optional): 0
Medical Necessity Information: It is in your best interest to select a reason for this procedure from the list below. All of these items fulfill various CMS LCD requirements except the new and changing color options.

## 2025-06-17 ENCOUNTER — APPOINTMENT (OUTPATIENT)
Dept: URBAN - METROPOLITAN AREA CLINIC 329 | Facility: CLINIC | Age: 50
Setting detail: DERMATOLOGY
End: 2025-06-17

## 2025-06-17 DIAGNOSIS — D485 NEOPLASM OF UNCERTAIN BEHAVIOR OF SKIN: ICD-10-CM | Status: INADEQUATELY CONTROLLED

## 2025-06-17 DIAGNOSIS — D22 MELANOCYTIC NEVI: ICD-10-CM | Status: UNCHANGED

## 2025-06-17 DIAGNOSIS — L57.8 OTHER SKIN CHANGES DUE TO CHRONIC EXPOSURE TO NONIONIZING RADIATION: ICD-10-CM | Status: STABLE

## 2025-06-17 DIAGNOSIS — D18.0 HEMANGIOMA: ICD-10-CM | Status: STABLE

## 2025-06-17 DIAGNOSIS — L82.1 OTHER SEBORRHEIC KERATOSIS: ICD-10-CM | Status: UNCHANGED

## 2025-06-17 PROBLEM — D48.5 NEOPLASM OF UNCERTAIN BEHAVIOR OF SKIN: Status: ACTIVE | Noted: 2025-06-17

## 2025-06-17 PROBLEM — D22.5 MELANOCYTIC NEVI OF TRUNK: Status: ACTIVE | Noted: 2025-06-17

## 2025-06-17 PROBLEM — D18.01 HEMANGIOMA OF SKIN AND SUBCUTANEOUS TISSUE: Status: ACTIVE | Noted: 2025-06-17

## 2025-06-17 PROCEDURE — ? SHAVE REMOVAL

## 2025-06-17 PROCEDURE — ? COUNSELING

## 2025-06-17 PROCEDURE — ? SUNSCREEN RECOMMENDATIONS

## 2025-06-17 PROCEDURE — ? FULL BODY SKIN EXAM

## 2025-06-17 ASSESSMENT — LOCATION DETAILED DESCRIPTION DERM
LOCATION DETAILED: LEFT MEDIAL SUPERIOR CHEST
LOCATION DETAILED: LEFT PROXIMAL DORSAL FOREARM
LOCATION DETAILED: RIGHT SUPERIOR MEDIAL UPPER BACK
LOCATION DETAILED: RIGHT PROXIMAL DORSAL FOREARM
LOCATION DETAILED: SUPERIOR MID FOREHEAD
LOCATION DETAILED: LEFT VENTRAL PROXIMAL FOREARM
LOCATION DETAILED: LEFT VENTRAL MEDIAL DISTAL FOREARM
LOCATION DETAILED: LEFT MEDIAL UPPER BACK

## 2025-06-17 ASSESSMENT — LOCATION SIMPLE DESCRIPTION DERM
LOCATION SIMPLE: RIGHT UPPER BACK
LOCATION SIMPLE: CHEST
LOCATION SIMPLE: RIGHT FOREARM
LOCATION SIMPLE: LEFT UPPER BACK
LOCATION SIMPLE: SUPERIOR FOREHEAD
LOCATION SIMPLE: LEFT FOREARM

## 2025-06-17 ASSESSMENT — LOCATION ZONE DERM
LOCATION ZONE: FACE
LOCATION ZONE: ARM
LOCATION ZONE: TRUNK

## 2025-06-17 NOTE — HPI: SKIN LESIONS
How Severe Is Your Skin Lesion?: mild
Is This A New Presentation, Or A Follow-Up?: Skin Lesions
Which Family Member (Optional)?: Mother (BCC)
Additional History: Pt has no concerns today

## 2025-06-17 NOTE — PROCEDURE: SHAVE REMOVAL
Medical Necessity Information: It is in your best interest to select a reason for this procedure from the list below. All of these items fulfill various CMS LCD requirements except the new and changing color options.
Medical Necessity Clause: This procedure was medically necessary because the lesion that was treated was:
Lab Facility: 0
Detail Level: Detailed
Was A Bandage Applied: Yes
Size Of Lesion In Cm (Required): 0.5
Depth Of Shave: dermis
Biopsy Method: Dermablade
Anesthesia Type: 1% lidocaine with epinephrine
Hemostasis: Drysol
Wound Care: Petrolatum
Render Path Notes In Note?: No
Consent was obtained from the patient. The risks and benefits to therapy were discussed in detail. Specifically, the risks of infection, scarring, bleeding, prolonged wound healing, incomplete removal, allergy to anesthesia, nerve injury and recurrence were addressed. Prior to the procedure, the treatment site was clearly identified and confirmed by the patient. All components of Universal Protocol/PAUSE Rule completed.
Post-Care Instructions: I reviewed with the patient in detail post-care instructions. Patient is to keep the biopsy site dry overnight, and then apply bacitracin twice daily until healed. Patient may apply hydrogen peroxide soaks to remove any crusting.
Notification Instructions: Patient will be notified of pathology results. However, patient instructed to call the office if not contacted within 2 weeks.
Billing Type: Third-Party Bill
Size Of Lesion In Cm (Required): 0.6

## 2025-07-24 ENCOUNTER — LAB (OUTPATIENT)
Dept: FAMILY MEDICINE CLINIC | Facility: CLINIC | Age: 50
End: 2025-07-24

## 2025-07-24 DIAGNOSIS — E03.9 PRIMARY HYPOTHYROIDISM: ICD-10-CM

## 2025-07-24 LAB — TSH W FREE THYROID IF ABNORMAL: 3.36 UIU/ML (ref 0.27–4.2)

## 2025-07-31 ENCOUNTER — OFFICE VISIT (OUTPATIENT)
Dept: FAMILY MEDICINE CLINIC | Facility: CLINIC | Age: 50
End: 2025-07-31
Payer: OTHER GOVERNMENT

## 2025-07-31 VITALS
HEIGHT: 64 IN | WEIGHT: 228 LBS | DIASTOLIC BLOOD PRESSURE: 72 MMHG | BODY MASS INDEX: 38.93 KG/M2 | SYSTOLIC BLOOD PRESSURE: 126 MMHG

## 2025-07-31 DIAGNOSIS — E03.9 ACQUIRED HYPOTHYROIDISM: ICD-10-CM

## 2025-07-31 DIAGNOSIS — E66.813 CLASS 3 SEVERE OBESITY DUE TO EXCESS CALORIES WITH SERIOUS COMORBIDITY AND BODY MASS INDEX (BMI) OF 40.0 TO 44.9 IN ADULT (HCC): ICD-10-CM

## 2025-07-31 PROCEDURE — 3074F SYST BP LT 130 MM HG: CPT | Performed by: FAMILY MEDICINE

## 2025-07-31 PROCEDURE — 99213 OFFICE O/P EST LOW 20 MIN: CPT | Performed by: FAMILY MEDICINE

## 2025-07-31 PROCEDURE — 3078F DIAST BP <80 MM HG: CPT | Performed by: FAMILY MEDICINE

## 2025-07-31 RX ORDER — SEMAGLUTIDE 2.4 MG/.75ML
2.4 INJECTION, SOLUTION SUBCUTANEOUS
Qty: 3 ML | Refills: 5 | Status: SHIPPED | OUTPATIENT
Start: 2025-07-31 | End: 2026-01-15

## 2025-07-31 RX ORDER — LEVOTHYROXINE SODIUM 137 UG/1
137 TABLET ORAL DAILY
Qty: 90 TABLET | Refills: 3 | Status: SHIPPED | OUTPATIENT
Start: 2025-07-31

## 2025-07-31 ASSESSMENT — ENCOUNTER SYMPTOMS
VOMITING: 0
SHORTNESS OF BREATH: 0
NAUSEA: 0

## 2025-07-31 NOTE — PROGRESS NOTES
PROGRESS NOTE    SUBJECTIVE:   Karina Snell is a 50 y.o. female seen for a follow up visit regarding the following chief complaint:     Chief Complaint   Patient presents with    Follow-up     Labs follow up           HPI  Patient presents the office today for follow-up of lab results without any new complaints    Past Medical History, Past Surgical History, Family history, Social History, and Medications were all reviewed with the patient today and updated as necessary.       Current Outpatient Medications   Medication Sig Dispense Refill    Semaglutide-Weight Management (WEGOVY) 2.4 MG/0.75ML SOAJ SC injection Inject 2.4 mg into the skin every 7 days 3 mL 5    levothyroxine (SYNTHROID) 137 MCG tablet Take 1 tablet by mouth daily 90 tablet 3    ketorolac (TORADOL) 10 MG tablet Take 1 tablet by mouth every 6 hours as needed for Pain 20 tablet 0    lisinopril-hydroCHLOROthiazide (PRINZIDE;ZESTORETIC) 20-25 MG per tablet Take 1 tablet by mouth daily 90 tablet 3    LEVOXYL 125 MCG tablet Take 1 tablet by mouth Daily 90 tablet 3    TART CHERRY PO       Collagen-Vitamin C-Biotin (COLLAGEN 1500/C PO) Take 1,500 mg by mouth daily Hair/ skin      zinc gluconate 50 MG tablet Take 1 tablet by mouth daily      vitamin C (ASCORBIC ACID) 500 MG tablet Take 1 tablet by mouth daily      escitalopram (LEXAPRO) 20 MG tablet Take 1 tablet by mouth daily 90 tablet 3     No current facility-administered medications for this visit.     Allergies   Allergen Reactions    Bupropion Shortness Of Breath and Anxiety    Ondansetron Itching     Patient Active Problem List   Diagnosis    Essential hypertension    AR (allergic rhinitis)    Morbid obesity with BMI of 40.0-44.9, adult (HCC)    Daytime sleepiness    KISHA (obstructive sleep apnea)    Primary hypothyroidism    Obstructive sleep apnea on CPAP    Hashimoto's thyroiditis    DUB (dysfunctional uterine bleeding)     Past Medical History:   Diagnosis Date    Acute sinusitis     Allergic